# Patient Record
Sex: FEMALE | Race: ASIAN | NOT HISPANIC OR LATINO | Employment: UNEMPLOYED | URBAN - METROPOLITAN AREA
[De-identification: names, ages, dates, MRNs, and addresses within clinical notes are randomized per-mention and may not be internally consistent; named-entity substitution may affect disease eponyms.]

---

## 2020-11-05 ENCOUNTER — HOSPITAL ENCOUNTER (EMERGENCY)
Age: 60
Discharge: HOME | End: 2020-11-05

## 2020-11-05 VITALS
SYSTOLIC BLOOD PRESSURE: 140 MMHG | WEIGHT: 157.41 LBS | OXYGEN SATURATION: 100 % | TEMPERATURE: 97.5 F | RESPIRATION RATE: 16 BRPM | HEART RATE: 70 BPM | DIASTOLIC BLOOD PRESSURE: 79 MMHG

## 2020-11-05 DIAGNOSIS — H43.391 VITREOUS FLOATERS OF RIGHT EYE: ICD-10-CM

## 2020-11-05 DIAGNOSIS — H57.11 PAIN IN RIGHT EYE: ICD-10-CM

## 2020-11-05 DIAGNOSIS — S05.01XA ABRASION OF RIGHT CORNEA, INITIAL ENCOUNTER: Primary | ICD-10-CM

## 2020-11-05 PROCEDURE — 10002801 HB RX 250 W/O HCPCS: Performed by: NURSE PRACTITIONER

## 2020-11-05 PROCEDURE — 99284 EMERGENCY DEPT VISIT MOD MDM: CPT | Performed by: NURSE PRACTITIONER

## 2020-11-05 PROCEDURE — 99283 EMERGENCY DEPT VISIT LOW MDM: CPT

## 2020-11-05 RX ORDER — PROPARACAINE HYDROCHLORIDE 5 MG/ML
1 SOLUTION/ DROPS OPHTHALMIC ONCE
Status: DISCONTINUED | OUTPATIENT
Start: 2020-11-05 | End: 2020-11-05 | Stop reason: HOSPADM

## 2020-11-05 SDOH — HEALTH STABILITY: MENTAL HEALTH: HOW OFTEN DO YOU HAVE A DRINK CONTAINING ALCOHOL?: NEVER

## 2020-11-05 ASSESSMENT — ENCOUNTER SYMPTOMS
HEADACHES: 0
FATIGUE: 0
DIZZINESS: 0
SHORTNESS OF BREATH: 0
EYE REDNESS: 0
WEAKNESS: 0
VOMITING: 0
FACIAL SWELLING: 0
WOUND: 0
FEVER: 0
NUMBNESS: 0
SINUS PAIN: 0
PHOTOPHOBIA: 1
CHILLS: 0
CONFUSION: 0
BRUISES/BLEEDS EASILY: 0
NAUSEA: 0
EYE ITCHING: 0
EYE PAIN: 1
ADENOPATHY: 0
EYE DISCHARGE: 0

## 2020-11-05 ASSESSMENT — PAIN DESCRIPTION - PAIN TYPE: TYPE: ACUTE PAIN

## 2020-11-05 ASSESSMENT — PAIN SCALES - GENERAL: PAINLEVEL_OUTOF10: 7

## 2021-06-07 RX ORDER — LOSARTAN POTASSIUM 50 MG/1
50 TABLET ORAL DAILY
COMMUNITY
End: 2022-01-05 | Stop reason: SDUPTHER

## 2021-06-07 RX ORDER — CLOPIDOGREL BISULFATE 75 MG/1
75 TABLET ORAL 2 TIMES WEEKLY
COMMUNITY
End: 2022-01-05 | Stop reason: SDUPTHER

## 2021-06-07 RX ORDER — ATENOLOL 25 MG/1
25 TABLET ORAL 2 TIMES WEEKLY
COMMUNITY
End: 2022-01-05 | Stop reason: SDUPTHER

## 2021-06-07 RX ORDER — CAPTOPRIL 25 MG/1
25 TABLET ORAL ONCE AS NEEDED
COMMUNITY
End: 2021-06-07 | Stop reason: SDUPTHER

## 2021-06-08 RX ORDER — CAPTOPRIL 25 MG/1
25 TABLET ORAL ONCE AS NEEDED
Qty: 90 TABLET | Refills: 0 | Status: SHIPPED | OUTPATIENT
Start: 2021-06-08 | End: 2022-01-05 | Stop reason: SDUPTHER

## 2021-06-23 RX ORDER — AMLODIPINE BESYLATE 5 MG/1
5 TABLET ORAL DAILY
COMMUNITY
End: 2021-06-23 | Stop reason: SDUPTHER

## 2021-06-24 RX ORDER — AMLODIPINE BESYLATE 5 MG/1
5 TABLET ORAL DAILY
Qty: 90 TABLET | Refills: 0 | Status: SHIPPED | OUTPATIENT
Start: 2021-06-24 | End: 2022-01-05 | Stop reason: SDUPTHER

## 2022-01-04 ENCOUNTER — OFFICE VISIT (OUTPATIENT)
Dept: FAMILY MEDICINE | Facility: CLINIC | Age: 62
End: 2022-01-04
Payer: COMMERCIAL

## 2022-01-04 VITALS
SYSTOLIC BLOOD PRESSURE: 142 MMHG | WEIGHT: 155.6 LBS | RESPIRATION RATE: 16 BRPM | HEART RATE: 65 BPM | BODY MASS INDEX: 27.57 KG/M2 | TEMPERATURE: 98.2 F | HEIGHT: 63 IN | DIASTOLIC BLOOD PRESSURE: 80 MMHG | OXYGEN SATURATION: 99 %

## 2022-01-04 DIAGNOSIS — M85.88 OSTEOPENIA OF SPINE: ICD-10-CM

## 2022-01-04 DIAGNOSIS — Z12.31 ENCOUNTER FOR SCREENING MAMMOGRAM FOR MALIGNANT NEOPLASM OF BREAST: ICD-10-CM

## 2022-01-04 DIAGNOSIS — D69.6: ICD-10-CM

## 2022-01-04 DIAGNOSIS — I10 HYPERTENSION, UNSPECIFIED TYPE: Primary | ICD-10-CM

## 2022-01-04 PROCEDURE — 90471 IMMUNIZATION ADMIN: CPT | Performed by: FAMILY MEDICINE

## 2022-01-04 PROCEDURE — 99214 OFFICE O/P EST MOD 30 MIN: CPT | Mod: 25 | Performed by: FAMILY MEDICINE

## 2022-01-04 PROCEDURE — 3008F BODY MASS INDEX DOCD: CPT | Performed by: FAMILY MEDICINE

## 2022-01-04 PROCEDURE — 90686 IIV4 VACC NO PRSV 0.5 ML IM: CPT | Performed by: FAMILY MEDICINE

## 2022-01-04 RX ORDER — HYDROGEN PEROXIDE 3 %
20 SOLUTION, NON-ORAL MISCELLANEOUS
COMMUNITY
End: 2022-01-04

## 2022-01-04 RX ORDER — ALENDRONATE SODIUM TABLET 35 MG/1
35 TABLET ORAL
Qty: 12 TABLET | Refills: 1 | Status: SHIPPED | OUTPATIENT
Start: 2022-01-04 | End: 2022-06-21

## 2022-01-04 ASSESSMENT — ENCOUNTER SYMPTOMS
NEUROLOGICAL NEGATIVE: 1
RESPIRATORY NEGATIVE: 1
EYES NEGATIVE: 1
ALLERGIC/IMMUNOLOGIC NEGATIVE: 1
CARDIOVASCULAR NEGATIVE: 1
CONSTITUTIONAL NEGATIVE: 1
HEMATOLOGIC/LYMPHATIC NEGATIVE: 1
MUSCULOSKELETAL NEGATIVE: 1
GASTROINTESTINAL NEGATIVE: 1
PSYCHIATRIC NEGATIVE: 1
ENDOCRINE NEGATIVE: 1

## 2022-01-04 ASSESSMENT — PATIENT HEALTH QUESTIONNAIRE - PHQ9: SUM OF ALL RESPONSES TO PHQ9 QUESTIONS 1 & 2: 0

## 2022-01-04 NOTE — PROGRESS NOTES
[facility] - [facility]  Dr. Jaspreet Wayne  [address] [place] [address]     Marjan Davidson is a 61 y.o. female who present for   Chief Complaint   Patient presents with   • Annual Exam        She returned from [place] recently.  Was feeling very well there.  Had bone density scan there showing osteopenia.  Daughter who accompanies her today wants her to go on medication.          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      Current Outpatient Medications:   •  amLODIPine (NORVASC) 5 mg tablet, Take 1 tablet (5 mg total) by mouth daily., Disp: 90 tablet, Rfl: 0  •  atenoloL (TENORMIN) 25 mg tablet, Take 25 mg by mouth once as needed., Disp: , Rfl:   •  captopriL (CAPOTEN) 25 mg tablet, Take 1 tablet (25 mg total) by mouth once as needed (if BP is more than 150/90). 1 tablet SL if blood pressure is more than 150/90, Disp: 90 tablet, Rfl: 0  •  clopidogreL (PLAVIX) 75 mg tablet, Take 75 mg by mouth 2 (two) times a week (Mon, Thu). Take one tablet twice a week, Disp: , Rfl:   •  esomeprazole 20 mg capsule, Take 20 mg by mouth daily before breakfast., Disp: , Rfl:   •  losartan (COZAAR) 50 mg tablet, Take 50 mg by mouth daily. Take one half 1/2 of tablet daily, Disp: , Rfl:     Review of Systems   Constitutional: Negative.    HENT: Negative.    Eyes: Negative.   "  Respiratory: Negative.    Cardiovascular: Negative.    Gastrointestinal: Negative.    Endocrine: Negative.    Genitourinary: Negative.    Musculoskeletal: Negative.    Skin: Negative.    Allergic/Immunologic: Negative.    Neurological: Negative.    Hematological: Negative.    Psychiatric/Behavioral: Negative.        Vitals:    01/04/22 1323   BP: (!) 142/80   Pulse: 65   Resp: 16   Temp: 36.8 °C (98.2 °F)   SpO2: 99%   Weight: 70.6 kg (155 lb 9.6 oz)   Height: 1.588 m (5' 2.5\")     Body mass index is 28.01 kg/m².    Physical Exam  Vitals reviewed.   Constitutional:       Appearance: Normal appearance.   HENT:      Right Ear: Tympanic membrane normal.      Left Ear: Tympanic membrane normal.      Mouth/Throat:      Mouth: Mucous membranes are moist.   Eyes:      Conjunctiva/sclera: Conjunctivae normal.   Cardiovascular:      Rate and Rhythm: Normal rate.      Heart sounds: Normal heart sounds. No murmur heard.  Pulmonary:      Effort: Pulmonary effort is normal.      Breath sounds: Normal breath sounds. No rales.   Abdominal:      General: Bowel sounds are normal.      Palpations: Abdomen is soft.      Tenderness: There is no abdominal tenderness.   Musculoskeletal:         General: Normal range of motion.      Cervical back: Normal range of motion.      Right lower leg: No edema.      Left lower leg: No edema.   Lymphadenopathy:      Cervical: No cervical adenopathy.   Skin:     General: Skin is warm.   Neurological:      General: No focal deficit present.      Mental Status: She is alert and oriented to person, place, and time.   Psychiatric:         Mood and Affect: Mood normal.             Assessment   Problem List Items Addressed This Visit     None              Jaspreet Wayne MD  1/4/2022     "

## 2022-01-05 RX ORDER — AMLODIPINE BESYLATE 5 MG/1
5 TABLET ORAL DAILY
Qty: 90 TABLET | Refills: 1 | Status: SHIPPED | OUTPATIENT
Start: 2022-01-05 | End: 2022-07-18

## 2022-01-05 RX ORDER — CAPTOPRIL 25 MG/1
25 TABLET ORAL ONCE AS NEEDED
Qty: 90 TABLET | Refills: 1 | Status: SHIPPED | OUTPATIENT
Start: 2022-01-05 | End: 2022-04-04 | Stop reason: SDUPTHER

## 2022-01-05 RX ORDER — ATENOLOL 25 MG/1
25 TABLET ORAL 2 TIMES WEEKLY
Qty: 24 TABLET | Refills: 1 | Status: SHIPPED | OUTPATIENT
Start: 2022-01-06 | End: 2022-06-21

## 2022-01-05 RX ORDER — LOSARTAN POTASSIUM 50 MG/1
50 TABLET ORAL DAILY
Qty: 90 TABLET | Refills: 1 | Status: SHIPPED | OUTPATIENT
Start: 2022-01-05 | End: 2022-03-28 | Stop reason: SDUPTHER

## 2022-01-05 RX ORDER — CLOPIDOGREL BISULFATE 75 MG/1
75 TABLET ORAL 2 TIMES WEEKLY
Qty: 24 TABLET | Refills: 1 | Status: SHIPPED | OUTPATIENT
Start: 2022-01-06 | End: 2022-06-21

## 2022-01-06 ENCOUNTER — TELEPHONE (OUTPATIENT)
Dept: FAMILY MEDICINE | Facility: CLINIC | Age: 62
End: 2022-01-06
Payer: COMMERCIAL

## 2022-01-12 ENCOUNTER — TELEPHONE (OUTPATIENT)
Dept: FAMILY MEDICINE | Facility: CLINIC | Age: 62
End: 2022-01-12
Payer: COMMERCIAL

## 2022-01-12 NOTE — TELEPHONE ENCOUNTER
[facility] needs clarification on whether the losartan should be 1 tablet daily or 1/2 tablet daily, please advise, thanks.

## 2022-01-20 LAB
ALBUMIN SERPL-MCNC: 4.4 G/DL (ref 3.8–4.9)
ALBUMIN/GLOB SERPL: 1.7 {RATIO} (ref 1.2–2.2)
ALP SERPL-CCNC: 50 IU/L (ref 44–121)
ALT SERPL-CCNC: 27 IU/L (ref 0–32)
AST SERPL-CCNC: 24 IU/L (ref 0–40)
BASOPHILS # BLD AUTO: 0.1 X10E3/UL (ref 0–0.2)
BASOPHILS NFR BLD AUTO: 1 %
BILIRUB SERPL-MCNC: 0.2 MG/DL (ref 0–1.2)
BUN SERPL-MCNC: 11 MG/DL (ref 8–27)
BUN/CREAT SERPL: 17 (ref 12–28)
CALCIUM SERPL-MCNC: 9.2 MG/DL (ref 8.7–10.3)
CHLORIDE SERPL-SCNC: 100 MMOL/L (ref 96–106)
CHOLEST SERPL-MCNC: 131 MG/DL (ref 100–199)
CO2 SERPL-SCNC: 23 MMOL/L (ref 20–29)
CREAT SERPL-MCNC: 0.66 MG/DL (ref 0.57–1)
EOSINOPHIL # BLD AUTO: 0.2 X10E3/UL (ref 0–0.4)
EOSINOPHIL NFR BLD AUTO: 2 %
ERYTHROCYTE [DISTWIDTH] IN BLOOD BY AUTOMATED COUNT: 12.4 % (ref 11.7–15.4)
GLOBULIN SER CALC-MCNC: 2.6 G/DL (ref 1.5–4.5)
GLUCOSE SERPL-MCNC: 104 MG/DL (ref 65–99)
HCT VFR BLD AUTO: 37.3 % (ref 34–46.6)
HDLC SERPL-MCNC: 53 MG/DL
HGB BLD-MCNC: 12.2 G/DL (ref 11.1–15.9)
IMM GRANULOCYTES # BLD AUTO: 0 X10E3/UL (ref 0–0.1)
IMM GRANULOCYTES NFR BLD AUTO: 0 %
IRON SATN MFR SERPL: 25 % (ref 15–55)
IRON SERPL-MCNC: 87 UG/DL (ref 27–159)
LAB CORP EGFR IF AFRICN AM: 111 ML/MIN/1.73
LAB CORP EGFR IF NONAFRICN AM: 96 ML/MIN/1.73
LDLC SERPL CALC-MCNC: 69 MG/DL (ref 0–99)
LYMPHOCYTES # BLD AUTO: 2.2 X10E3/UL (ref 0.7–3.1)
LYMPHOCYTES NFR BLD AUTO: 32 %
MCH RBC QN AUTO: 27.5 PG (ref 26.6–33)
MCHC RBC AUTO-ENTMCNC: 32.7 G/DL (ref 31.5–35.7)
MCV RBC AUTO: 84 FL (ref 79–97)
MONOCYTES # BLD AUTO: 0.5 X10E3/UL (ref 0.1–0.9)
MONOCYTES NFR BLD AUTO: 8 %
NEUTROPHILS # BLD AUTO: 3.9 X10E3/UL (ref 1.4–7)
NEUTROPHILS NFR BLD AUTO: 57 %
PLATELET # BLD AUTO: 332 X10E3/UL (ref 150–450)
POTASSIUM SERPL-SCNC: 4.3 MMOL/L (ref 3.5–5.2)
PROT SERPL-MCNC: 7 G/DL (ref 6–8.5)
RBC # BLD AUTO: 4.44 X10E6/UL (ref 3.77–5.28)
SODIUM SERPL-SCNC: 138 MMOL/L (ref 134–144)
TIBC SERPL-MCNC: 345 UG/DL (ref 250–450)
TRIGL SERPL-MCNC: 35 MG/DL (ref 0–149)
TSH SERPL DL<=0.005 MIU/L-ACNC: 2.25 UIU/ML (ref 0.45–4.5)
UIBC SERPL-MCNC: 258 UG/DL (ref 131–425)
VIT B12 SERPL-MCNC: 218 PG/ML (ref 232–1245)
VLDLC SERPL CALC-MCNC: 9 MG/DL (ref 5–40)
WBC # BLD AUTO: 6.8 X10E3/UL (ref 3.4–10.8)

## 2022-01-21 LAB
25(OH)D3+25(OH)D2 SERPL-MCNC: 81.5 NG/ML (ref 30–100)
APPEARANCE UR: CLEAR
BACTERIA #/AREA URNS HPF: NORMAL /[HPF]
BILIRUB UR QL STRIP: NEGATIVE
CASTS URNS QL MICRO: NORMAL /LPF
COLOR UR: YELLOW
EPI CELLS #/AREA URNS HPF: NORMAL /HPF (ref 0–10)
GLUCOSE UR QL STRIP: NEGATIVE
HGB UR QL STRIP: ABNORMAL
KETONES UR QL STRIP: NEGATIVE
LAB CORP URINALYSIS REFLEX: ABNORMAL
LEUKOCYTE ESTERASE UR QL STRIP: NEGATIVE
MICRO URNS: ABNORMAL
NITRITE UR QL STRIP: NEGATIVE
PH UR STRIP: 6.5 [PH] (ref 5–7.5)
PROT UR QL STRIP: NEGATIVE
RBC #/AREA URNS HPF: NORMAL /HPF (ref 0–2)
SP GR UR STRIP: 1.01 (ref 1–1.03)
UROBILINOGEN UR STRIP-MCNC: 0.2 MG/DL (ref 0.2–1)
WBC #/AREA URNS HPF: NORMAL /HPF (ref 0–5)

## 2022-01-31 ENCOUNTER — TELEPHONE (OUTPATIENT)
Dept: FAMILY MEDICINE | Facility: CLINIC | Age: 62
End: 2022-01-31
Payer: COMMERCIAL

## 2022-01-31 NOTE — TELEPHONE ENCOUNTER
I reviewed labs-it does look like Dr Wayne has as well-let patient know-slightly low vitamin b 12, slightly elevated glucose-104- rec balanced diet/routine exercise simple carbs/foods/drinks high in sugar. Cont follow up with Dr Wayne

## 2022-03-28 RX ORDER — LOSARTAN POTASSIUM 50 MG/1
50 TABLET ORAL DAILY
Qty: 90 TABLET | Refills: 1 | Status: SHIPPED | OUTPATIENT
Start: 2022-03-28 | End: 2022-04-04 | Stop reason: SDUPTHER

## 2022-03-28 NOTE — TELEPHONE ENCOUNTER
Pt's daughter called requesting to return to a full tablet of losartan per day, rather than half. She stated Dr. Wayne instructed her to call if an increase was needed.     Medicine Refill Request    Last Office: 1/4/2022   Last Consult Visit: Visit date not found  Last Telemedicine Visit: Visit date not found    Next Appointment: Visit date not found      Current Outpatient Medications:   •  alendronate 35 mg tablet, Take 1 tablet (35 mg total) by mouth every (seven) 7 days. Take in the morning with a full glass of water, on an empty stomach, and do not take anything else by mouth or lie down for the next 30 min., Disp: 12 tablet, Rfl: 1  •  amLODIPine 5 mg tablet, Take 1 tablet (5 mg total) by mouth daily., Disp: 90 tablet, Rfl: 1  •  atenoloL 25 mg tablet, Take 1 tablet (25 mg total) by mouth 2 (two) times a week (Mon, Thu)., Disp: 24 tablet, Rfl: 1  •  captopriL 25 mg tablet, Take 1 tablet (25 mg total) by mouth once as needed (if BP is more than 150/90). 1 tablet SL if blood pressure is more than 150/90, Disp: 90 tablet, Rfl: 1  •  clopidogreL 75 mg tablet, Take 1 tablet (75 mg total) by mouth 2 (two) times a week (Mon, Thu). Take one tablet twice a week, Disp: 24 tablet, Rfl: 1  •  losartan 50 mg tablet, Take 1 tablet (50 mg total) by mouth daily. Take one half 1/2 of tablet daily, Disp: 90 tablet, Rfl: 1      BP Readings from Last 3 Encounters:   01/04/22 (!) 142/80       Recent Lab results:  Lab Results   Component Value Date    CHOL 131 01/20/2022   ,   Lab Results   Component Value Date    HDL 53 01/20/2022   ,   Lab Results   Component Value Date    LDLCALC 69 01/20/2022   ,   Lab Results   Component Value Date    TRIG 35 01/20/2022        Lab Results   Component Value Date    GLUCOSE 104 (H) 01/20/2022   , No results found for: HGBA1C      Lab Results   Component Value Date    CREATININE 0.66 01/20/2022       Lab Results   Component Value Date    TSH 2.250 01/20/2022

## 2022-04-04 ENCOUNTER — TELEPHONE (OUTPATIENT)
Dept: FAMILY MEDICINE | Facility: CLINIC | Age: 62
End: 2022-04-04
Payer: COMMERCIAL

## 2022-04-04 RX ORDER — CAPTOPRIL 25 MG/1
25 TABLET ORAL ONCE AS NEEDED
Qty: 90 TABLET | Refills: 1 | Status: SHIPPED | OUTPATIENT
Start: 2022-04-04 | End: 2024-03-04 | Stop reason: SDUPTHER

## 2022-04-04 RX ORDER — LOSARTAN POTASSIUM 50 MG/1
50 TABLET ORAL DAILY
Qty: 90 TABLET | Refills: 1 | Status: SHIPPED | OUTPATIENT
Start: 2022-04-04 | End: 2022-09-30

## 2022-04-04 NOTE — TELEPHONE ENCOUNTER
Pt needs refill of captopril 25mg.     Medicine Refill Request    Last Office: 1/4/2022   Last Consult Visit: Visit date not found  Last Telemedicine Visit: Visit date not found    Next Appointment: Visit date not found      Current Outpatient Medications:   •  alendronate 35 mg tablet, Take 1 tablet (35 mg total) by mouth every (seven) 7 days. Take in the morning with a full glass of water, on an empty stomach, and do not take anything else by mouth or lie down for the next 30 min., Disp: 12 tablet, Rfl: 1  •  amLODIPine 5 mg tablet, Take 1 tablet (5 mg total) by mouth daily., Disp: 90 tablet, Rfl: 1  •  atenoloL 25 mg tablet, Take 1 tablet (25 mg total) by mouth 2 (two) times a week (Mon, Thu)., Disp: 24 tablet, Rfl: 1  •  captopriL 25 mg tablet, Take 1 tablet (25 mg total) by mouth once as needed (if BP is more than 150/90). 1 tablet SL if blood pressure is more than 150/90, Disp: 90 tablet, Rfl: 1  •  clopidogreL 75 mg tablet, Take 1 tablet (75 mg total) by mouth 2 (two) times a week (Mon, Thu). Take one tablet twice a week, Disp: 24 tablet, Rfl: 1  •  losartan (COZAAR) 50 mg tablet, Take 1 tablet (50 mg total) by mouth daily. Take one half 1/2 of tablet daily, Disp: 90 tablet, Rfl: 1      BP Readings from Last 3 Encounters:   01/04/22 (!) 142/80       Recent Lab results:  Lab Results   Component Value Date    CHOL 131 01/20/2022   ,   Lab Results   Component Value Date    HDL 53 01/20/2022   ,   Lab Results   Component Value Date    LDLCALC 69 01/20/2022   ,   Lab Results   Component Value Date    TRIG 35 01/20/2022        Lab Results   Component Value Date    GLUCOSE 104 (H) 01/20/2022   , No results found for: HGBA1C      Lab Results   Component Value Date    CREATININE 0.66 01/20/2022       Lab Results   Component Value Date    TSH 2.250 01/20/2022

## 2022-04-04 NOTE — TELEPHONE ENCOUNTER
The script that was sent over has 2 different dosing instructions. I called the pharm and they need a new script that says 1 tablet daily.     Pt is fully out of medication.

## 2022-04-22 ENCOUNTER — TELEPHONE (OUTPATIENT)
Dept: FAMILY MEDICINE | Facility: CLINIC | Age: 62
End: 2022-04-22

## 2022-04-22 NOTE — TELEPHONE ENCOUNTER
Pt's daughter states they already got the amlodipine refilled and she is unable to come in for an appt because she will be traveling Monday morning for 5 months.

## 2022-06-21 RX ORDER — ALENDRONATE SODIUM TABLET 35 MG/1
TABLET ORAL
Qty: 12 TABLET | Refills: 1 | Status: SHIPPED | OUTPATIENT
Start: 2022-06-21 | End: 2023-01-30

## 2022-06-21 RX ORDER — ATENOLOL 25 MG/1
25 TABLET ORAL 2 TIMES WEEKLY
Qty: 24 TABLET | Refills: 1 | Status: SHIPPED | OUTPATIENT
Start: 2022-06-23 | End: 2023-01-24

## 2022-06-21 RX ORDER — CLOPIDOGREL BISULFATE 75 MG/1
75 TABLET ORAL 2 TIMES WEEKLY
Qty: 24 TABLET | Refills: 1 | Status: SHIPPED | OUTPATIENT
Start: 2022-06-23 | End: 2023-01-30

## 2022-07-18 RX ORDER — AMLODIPINE BESYLATE 5 MG/1
5 TABLET ORAL DAILY
Qty: 90 TABLET | Refills: 1 | Status: SHIPPED | OUTPATIENT
Start: 2022-07-18 | End: 2023-01-30 | Stop reason: SDUPTHER

## 2022-09-30 RX ORDER — LOSARTAN POTASSIUM 50 MG/1
TABLET ORAL
Qty: 90 TABLET | Refills: 0 | Status: SHIPPED | OUTPATIENT
Start: 2022-09-30 | End: 2023-01-30 | Stop reason: SDUPTHER

## 2022-12-12 RX ORDER — ALENDRONATE SODIUM TABLET 35 MG/1
TABLET ORAL
Qty: 12 TABLET | Refills: 1 | OUTPATIENT
Start: 2022-12-12

## 2022-12-12 RX ORDER — ATENOLOL 25 MG/1
25 TABLET ORAL 2 TIMES WEEKLY
Qty: 24 TABLET | Refills: 1 | OUTPATIENT
Start: 2022-12-12 | End: 2023-06-10

## 2022-12-12 RX ORDER — CLOPIDOGREL BISULFATE 75 MG/1
TABLET ORAL
Qty: 24 TABLET | Refills: 1 | OUTPATIENT
Start: 2022-12-12

## 2022-12-12 NOTE — TELEPHONE ENCOUNTER
Pt has not been seen since 01/22 called daughter she does not need a refill yet but they will have her come in before she is due.

## 2023-01-13 RX ORDER — AMLODIPINE BESYLATE 5 MG/1
5 TABLET ORAL DAILY
Qty: 90 TABLET | Refills: 1 | OUTPATIENT
Start: 2023-01-13 | End: 2023-04-13

## 2023-01-13 NOTE — TELEPHONE ENCOUNTER
Called patient spoke with daughter patient does not need medication right now she will be back end of January, she asked that we hold off on filling and she will schedule an appointment

## 2023-01-24 ENCOUNTER — OFFICE VISIT (OUTPATIENT)
Dept: FAMILY MEDICINE | Facility: CLINIC | Age: 63
End: 2023-01-24
Payer: COMMERCIAL

## 2023-01-24 VITALS
BODY MASS INDEX: 30.59 KG/M2 | HEIGHT: 62 IN | OXYGEN SATURATION: 99 % | SYSTOLIC BLOOD PRESSURE: 132 MMHG | WEIGHT: 166.2 LBS | RESPIRATION RATE: 16 BRPM | DIASTOLIC BLOOD PRESSURE: 88 MMHG | HEART RATE: 65 BPM | TEMPERATURE: 97.7 F

## 2023-01-24 DIAGNOSIS — Z00.00 ROUTINE GENERAL MEDICAL EXAMINATION AT A HEALTH CARE FACILITY: Primary | ICD-10-CM

## 2023-01-24 DIAGNOSIS — M85.88 OSTEOPENIA OF SPINE: ICD-10-CM

## 2023-01-24 DIAGNOSIS — Z12.11 SCREENING FOR COLON CANCER: ICD-10-CM

## 2023-01-24 PROCEDURE — 99396 PREV VISIT EST AGE 40-64: CPT | Performed by: FAMILY MEDICINE

## 2023-01-24 PROCEDURE — 3008F BODY MASS INDEX DOCD: CPT | Performed by: FAMILY MEDICINE

## 2023-01-24 ASSESSMENT — ENCOUNTER SYMPTOMS
NEUROLOGICAL NEGATIVE: 1
EYES NEGATIVE: 1
CONSTITUTIONAL NEGATIVE: 1
ARTHRALGIAS: 1
ENDOCRINE NEGATIVE: 1
RESPIRATORY NEGATIVE: 1
HEMATOLOGIC/LYMPHATIC NEGATIVE: 1
CARDIOVASCULAR NEGATIVE: 1
PSYCHIATRIC NEGATIVE: 1
ALLERGIC/IMMUNOLOGIC NEGATIVE: 1
GASTROINTESTINAL NEGATIVE: 1

## 2023-01-24 NOTE — PROGRESS NOTES
[facility]  Dr. Jaspreet Wayne  [address] [place] [address]     Marjan Davidson is a 62 y.o. female who present for   Chief Complaint   Patient presents with   • Annual Exam        Accompanied by daughter.          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      Current Outpatient Medications:   •  alendronate (FOSAMAX) 35 mg tablet, 1 TAB EVERY 7 DAYS IN MORNING WITH A FULL GLASS OF WATER ON EMPTY STOMACH, DO NOT TAKE ANYTHING ELSE BY MOUTH OR DO NOT LIE DOWN FOR THE NEXT 30 MIN, Disp: 12 tablet, Rfl: 1  •  amLODIPine (NORVASC) 5 mg tablet, TAKE 1 TABLET (5 MG TOTAL) BY MOUTH DAILY., Disp: 90 tablet, Rfl: 1  •  clopidogreL (PLAVIX) 75 mg tablet, TAKE 1 TABLET (75 MG TOTAL) BY MOUTH 2 (TWO) TIMES A WEEK (MON, THU). TAKE ONE TABLET TWICE A WEEK, Disp: 24 tablet, Rfl: 1  •  losartan (COZAAR) 50 mg tablet, TAKE 1 TABLET BY MOUTH EVERY DAY, Disp: 90 tablet, Rfl: 0  •  captopriL (CAPOTEN) 25 mg tablet, Take 1 tablet (25 mg total) by mouth once as needed (if BP is more than 150/90). 1 tablet SL if blood pressure is more than 150/90, Disp: 90 tablet, Rfl: 1    Review of Systems   Constitutional: Negative.    HENT: Negative.    Eyes: Negative.    Respiratory: Negative.    Cardiovascular: Negative.    Gastrointestinal: Negative.    Endocrine: Negative.    Genitourinary: Negative.   "  Musculoskeletal: Positive for arthralgias (Lt knee pain.).   Skin: Negative.    Allergic/Immunologic: Negative.    Neurological: Negative.    Hematological: Negative.    Psychiatric/Behavioral: Negative.        Vitals:    01/24/23 1331   BP: 132/88   BP Location: Right upper arm   Patient Position: Sitting   Pulse: 65   Resp: 16   Temp: 36.5 °C (97.7 °F)   TempSrc: Oral   SpO2: 99%   Weight: 75.4 kg (166 lb 3.2 oz)   Height: 1.575 m (5' 2\")     Body mass index is 30.4 kg/m².    Physical Exam  Vitals reviewed.   Constitutional:       Appearance: Normal appearance.   HENT:      Right Ear: Tympanic membrane normal.      Left Ear: Tympanic membrane normal.      Mouth/Throat:      Mouth: Mucous membranes are moist.   Eyes:      Conjunctiva/sclera: Conjunctivae normal.   Cardiovascular:      Rate and Rhythm: Normal rate.      Heart sounds: Normal heart sounds. No murmur heard.  Pulmonary:      Effort: Pulmonary effort is normal.      Breath sounds: Normal breath sounds. No rales.   Abdominal:      General: Bowel sounds are normal.      Palpations: Abdomen is soft.      Tenderness: There is no abdominal tenderness.   Musculoskeletal:         General: Normal range of motion.      Cervical back: Normal range of motion.      Right lower leg: No edema.      Left lower leg: No edema.      Comments: Hallux valgus of Rt great toe.   Lymphadenopathy:      Cervical: No cervical adenopathy.   Skin:     General: Skin is warm.   Neurological:      General: No focal deficit present.      Mental Status: She is alert and oriented to person, place, and time.   Psychiatric:         Mood and Affect: Mood normal.             Assessment   Problem List Items Addressed This Visit        Musculoskeletal    Osteopenia of spine    Relevant Orders    DEXA BONE DENSITY       Other    Routine general medical examination at a health care facility - Primary    Relevant Orders    CBC and Differential    Comprehensive metabolic panel    Lipid panel    " TSH 3rd Generation    Hemoglobin A1c    Vitamin D 25 hydroxy   Other Visit Diagnoses     Screening for colon cancer        Relevant Orders    Fecal Immunochemical              Jaspreet Wayne MD  1/24/2023

## 2023-01-30 ENCOUNTER — TELEPHONE (OUTPATIENT)
Dept: FAMILY MEDICINE | Facility: CLINIC | Age: 63
End: 2023-01-30
Payer: COMMERCIAL

## 2023-01-30 RX ORDER — AMLODIPINE BESYLATE 5 MG/1
5 TABLET ORAL DAILY
Qty: 90 TABLET | Refills: 1 | Status: SHIPPED | OUTPATIENT
Start: 2023-01-30 | End: 2023-08-01

## 2023-01-30 RX ORDER — LOSARTAN POTASSIUM 50 MG/1
50 TABLET ORAL DAILY
Qty: 90 TABLET | Refills: 1 | Status: SHIPPED | OUTPATIENT
Start: 2023-01-30 | End: 2023-08-01

## 2023-01-30 RX ORDER — ALENDRONATE SODIUM TABLET 35 MG/1
TABLET ORAL
Qty: 12 TABLET | Refills: 1 | Status: SHIPPED | OUTPATIENT
Start: 2023-01-30 | End: 2023-07-17

## 2023-01-30 RX ORDER — CLOPIDOGREL BISULFATE 75 MG/1
TABLET ORAL
Qty: 24 TABLET | Refills: 1 | Status: SHIPPED | OUTPATIENT
Start: 2023-01-30 | End: 2023-07-17

## 2023-01-31 LAB
BASOPHILS # BLD AUTO: 0.1 X10E3/UL (ref 0–0.2)
BASOPHILS NFR BLD AUTO: 1 %
EOSINOPHIL # BLD AUTO: 0.2 X10E3/UL (ref 0–0.4)
EOSINOPHIL NFR BLD AUTO: 2 %
ERYTHROCYTE [DISTWIDTH] IN BLOOD BY AUTOMATED COUNT: 12.8 % (ref 11.7–15.4)
HBA1C MFR BLD: 6.5 % (ref 4.8–5.6)
HCT VFR BLD AUTO: 37.2 % (ref 34–46.6)
HGB BLD-MCNC: 12.5 G/DL (ref 11.1–15.9)
IMM GRANULOCYTES # BLD AUTO: 0 X10E3/UL (ref 0–0.1)
IMM GRANULOCYTES NFR BLD AUTO: 0 %
LYMPHOCYTES # BLD AUTO: 2.2 X10E3/UL (ref 0.7–3.1)
LYMPHOCYTES NFR BLD AUTO: 34 %
MCH RBC QN AUTO: 28.2 PG (ref 26.6–33)
MCHC RBC AUTO-ENTMCNC: 33.6 G/DL (ref 31.5–35.7)
MCV RBC AUTO: 84 FL (ref 79–97)
MONOCYTES # BLD AUTO: 0.5 X10E3/UL (ref 0.1–0.9)
MONOCYTES NFR BLD AUTO: 8 %
NEUTROPHILS # BLD AUTO: 3.7 X10E3/UL (ref 1.4–7)
NEUTROPHILS NFR BLD AUTO: 55 %
PLATELET # BLD AUTO: 284 X10E3/UL (ref 150–450)
RBC # BLD AUTO: 4.43 X10E6/UL (ref 3.77–5.28)
WBC # BLD AUTO: 6.6 X10E3/UL (ref 3.4–10.8)

## 2023-01-31 RX ORDER — LOSARTAN POTASSIUM 50 MG/1
TABLET ORAL
Qty: 90 TABLET | Refills: 0 | OUTPATIENT
Start: 2023-01-31

## 2023-01-31 RX ORDER — AMLODIPINE BESYLATE 5 MG/1
5 TABLET ORAL DAILY
Qty: 90 TABLET | Refills: 1 | OUTPATIENT
Start: 2023-01-31 | End: 2023-05-01

## 2023-02-01 LAB
25(OH)D3+25(OH)D2 SERPL-MCNC: 79.4 NG/ML (ref 30–100)
ALBUMIN SERPL-MCNC: 4.4 G/DL (ref 3.8–4.8)
ALBUMIN/GLOB SERPL: 1.8 {RATIO} (ref 1.2–2.2)
ALP SERPL-CCNC: 45 IU/L (ref 44–121)
ALT SERPL-CCNC: 18 IU/L (ref 0–32)
AST SERPL-CCNC: 22 IU/L (ref 0–40)
BILIRUB SERPL-MCNC: 0.3 MG/DL (ref 0–1.2)
BUN SERPL-MCNC: 18 MG/DL (ref 8–27)
BUN/CREAT SERPL: 26 (ref 12–28)
CALCIUM SERPL-MCNC: 9.5 MG/DL (ref 8.7–10.3)
CHLORIDE SERPL-SCNC: 101 MMOL/L (ref 96–106)
CHOLEST SERPL-MCNC: 129 MG/DL (ref 100–199)
CO2 SERPL-SCNC: 24 MMOL/L (ref 20–29)
CREAT SERPL-MCNC: 0.68 MG/DL (ref 0.57–1)
EGFRCR SERPLBLD CKD-EPI 2021: 99 ML/MIN/1.73
GLOBULIN SER CALC-MCNC: 2.5 G/DL (ref 1.5–4.5)
GLUCOSE SERPL-MCNC: 97 MG/DL (ref 70–99)
HDLC SERPL-MCNC: 59 MG/DL
HEMOCCULT STL QL IA: NEGATIVE
LDLC SERPL CALC-MCNC: 61 MG/DL (ref 0–99)
POTASSIUM SERPL-SCNC: 4.3 MMOL/L (ref 3.5–5.2)
PROT SERPL-MCNC: 6.9 G/DL (ref 6–8.5)
SODIUM SERPL-SCNC: 139 MMOL/L (ref 134–144)
TRIGL SERPL-MCNC: 35 MG/DL (ref 0–149)
TSH SERPL DL<=0.005 MIU/L-ACNC: 2.25 UIU/ML (ref 0.45–4.5)
VLDLC SERPL CALC-MCNC: 9 MG/DL (ref 5–40)

## 2023-02-02 NOTE — RESULT ENCOUNTER NOTE
Please let patient know the blood work is all normal except A1c that is borderline high.  Please ask her to be careful with sugar and carbohydrates.  Stool testing is negative for blood.

## 2023-07-17 RX ORDER — ALENDRONATE SODIUM TABLET 35 MG/1
TABLET ORAL
Qty: 12 TABLET | Refills: 1 | Status: SHIPPED | OUTPATIENT
Start: 2023-07-17 | End: 2024-09-18

## 2023-07-17 RX ORDER — CLOPIDOGREL BISULFATE 75 MG/1
TABLET ORAL
Qty: 24 TABLET | Refills: 1 | Status: SHIPPED | OUTPATIENT
Start: 2023-07-17 | End: 2024-03-04 | Stop reason: SDUPTHER

## 2023-08-01 RX ORDER — LOSARTAN POTASSIUM 50 MG/1
50 TABLET ORAL DAILY
Qty: 90 TABLET | Refills: 1 | Status: SHIPPED | OUTPATIENT
Start: 2023-08-01 | End: 2024-03-04 | Stop reason: SDUPTHER

## 2023-08-01 RX ORDER — AMLODIPINE BESYLATE 5 MG/1
5 TABLET ORAL DAILY
Qty: 90 TABLET | Refills: 1 | Status: SHIPPED | OUTPATIENT
Start: 2023-08-01 | End: 2024-03-04 | Stop reason: SDUPTHER

## 2023-08-01 NOTE — TELEPHONE ENCOUNTER
Medicine Refill Request    Last Office Visit: 1/24/2023   Last Consult Visit: Visit date not found  Last Telemedicine Visit: Visit date not found    Next Appointment: Visit date not found      Current Outpatient Medications:   •  alendronate (FOSAMAX) 35 mg tablet, TAKE 1 TABLET BY MOUTH EVERY 7 DAYS IN MORNING WITH A FULL GLASS OF WATER ON EMPTY STOMACH, DO NOT TAKE ANYTHING ELSE BY MOUTH OR DO NOT LIE DOWN FOR THE NEXT 30 MIN, Disp: 12 tablet, Rfl: 1  •  amLODIPine (NORVASC) 5 mg tablet, Take 1 tablet (5 mg total) by mouth daily., Disp: 90 tablet, Rfl: 1  •  captopriL (CAPOTEN) 25 mg tablet, Take 1 tablet (25 mg total) by mouth once as needed (if BP is more than 150/90). 1 tablet SL if blood pressure is more than 150/90, Disp: 90 tablet, Rfl: 1  •  clopidogreL (PLAVIX) 75 mg tablet, TAKE 1 TABLET BY MOUTH 2 TIMES A WEEK ON MONDAY AND THURSDAY, Disp: 24 tablet, Rfl: 1  •  losartan (COZAAR) 50 mg tablet, Take 1 tablet (50 mg total) by mouth daily., Disp: 90 tablet, Rfl: 1      BP Readings from Last 3 Encounters:   01/24/23 132/88   01/04/22 (!) 142/80       Recent Lab results:  Lab Results   Component Value Date    CHOL 129 01/31/2023   ,   Lab Results   Component Value Date    HDL 59 01/31/2023   ,   Lab Results   Component Value Date    LDLCALC 61 01/31/2023   ,   Lab Results   Component Value Date    TRIG 35 01/31/2023        Lab Results   Component Value Date    GLUCOSE 97 01/31/2023   ,   Lab Results   Component Value Date    HGBA1C 6.5 (H) 01/31/2023         Lab Results   Component Value Date    CREATININE 0.68 01/31/2023       Lab Results   Component Value Date    TSH 2.250 01/31/2023           Lab Results   Component Value Date    HGBA1C 6.5 (H) 01/31/2023

## 2024-01-11 DIAGNOSIS — M85.88 OSTEOPENIA OF SPINE: Primary | ICD-10-CM

## 2024-01-15 NOTE — TELEPHONE ENCOUNTER
Medicine Refill Request    Last Office Visit: 1/24/2023   Last Consult Visit: Visit date not found  Last Telemedicine Visit: Visit date not found    Next Appointment: Visit date not found      Current Outpatient Medications:   •  alendronate (FOSAMAX) 35 mg tablet, TAKE 1 TABLET BY MOUTH EVERY 7 DAYS IN MORNING WITH A FULL GLASS OF WATER ON EMPTY STOMACH, DO NOT TAKE ANYTHING ELSE BY MOUTH OR DO NOT LIE DOWN FOR THE NEXT 30 MIN, Disp: 12 tablet, Rfl: 1  •  amLODIPine (NORVASC) 5 mg tablet, TAKE 1 TABLET (5 MG TOTAL) BY MOUTH DAILY., Disp: 90 tablet, Rfl: 1  •  captopriL (CAPOTEN) 25 mg tablet, Take 1 tablet (25 mg total) by mouth once as needed (if BP is more than 150/90). 1 tablet SL if blood pressure is more than 150/90, Disp: 90 tablet, Rfl: 1  •  clopidogreL (PLAVIX) 75 mg tablet, TAKE 1 TABLET BY MOUTH 2 TIMES A WEEK ON MONDAY AND THURSDAY, Disp: 24 tablet, Rfl: 1  •  losartan (COZAAR) 50 mg tablet, TAKE 1 TABLET BY MOUTH EVERY DAY, Disp: 90 tablet, Rfl: 1      BP Readings from Last 3 Encounters:   01/24/23 132/88   01/04/22 (!) 142/80       Recent Lab results:  Lab Results   Component Value Date    CHOL 129 01/31/2023   ,   Lab Results   Component Value Date    HDL 59 01/31/2023   ,   Lab Results   Component Value Date    LDLCALC 61 01/31/2023   ,   Lab Results   Component Value Date    TRIG 35 01/31/2023        Lab Results   Component Value Date    GLUCOSE 97 01/31/2023   ,   Lab Results   Component Value Date    HGBA1C 6.5 (H) 01/31/2023         Lab Results   Component Value Date    CREATININE 0.68 01/31/2023       Lab Results   Component Value Date    TSH 2.250 01/31/2023           Lab Results   Component Value Date    HGBA1C 6.5 (H) 01/31/2023

## 2024-01-16 RX ORDER — CLOPIDOGREL BISULFATE 75 MG/1
TABLET ORAL
Qty: 24 TABLET | Refills: 1 | OUTPATIENT
Start: 2024-01-16

## 2024-01-16 RX ORDER — ALENDRONATE SODIUM TABLET 35 MG/1
TABLET ORAL
Qty: 12 TABLET | Refills: 1 | OUTPATIENT
Start: 2024-01-16

## 2024-02-14 ENCOUNTER — OFFICE VISIT (OUTPATIENT)
Dept: FAMILY MEDICINE | Facility: CLINIC | Age: 64
End: 2024-02-14
Payer: COMMERCIAL

## 2024-02-14 VITALS
OXYGEN SATURATION: 99 % | SYSTOLIC BLOOD PRESSURE: 124 MMHG | DIASTOLIC BLOOD PRESSURE: 76 MMHG | TEMPERATURE: 98.6 F | WEIGHT: 163 LBS | HEART RATE: 70 BPM | BODY MASS INDEX: 29.81 KG/M2

## 2024-02-14 DIAGNOSIS — M85.88 OSTEOPENIA OF SPINE: ICD-10-CM

## 2024-02-14 DIAGNOSIS — I10 HYPERTENSION, UNSPECIFIED TYPE: ICD-10-CM

## 2024-02-14 DIAGNOSIS — K02.9 DENTAL DECAY: Primary | ICD-10-CM

## 2024-02-14 DIAGNOSIS — E11.9 TYPE 2 DIABETES MELLITUS WITHOUT COMPLICATION, WITHOUT LONG-TERM CURRENT USE OF INSULIN (CMS/HCC): ICD-10-CM

## 2024-02-14 PROCEDURE — 99214 OFFICE O/P EST MOD 30 MIN: CPT | Performed by: FAMILY MEDICINE

## 2024-02-14 PROCEDURE — 3074F SYST BP LT 130 MM HG: CPT | Performed by: FAMILY MEDICINE

## 2024-02-14 PROCEDURE — 3008F BODY MASS INDEX DOCD: CPT | Performed by: FAMILY MEDICINE

## 2024-02-14 PROCEDURE — 3078F DIAST BP <80 MM HG: CPT | Performed by: FAMILY MEDICINE

## 2024-02-14 ASSESSMENT — PATIENT HEALTH QUESTIONNAIRE - PHQ9: SUM OF ALL RESPONSES TO PHQ9 QUESTIONS 1 & 2: 0

## 2024-02-14 NOTE — PROGRESS NOTES
[facility]  Dr. Jaspreet Wayne  [address] [place] [address]     Marjan Davidson is a 63 y.o. female who present for   Chief Complaint   Patient presents with   • Forms/questionnaires        Accompanied by her daughter who she lives with.  Patient has been traveling to [facility] and [place].  Patient needs form completed for the dentist, she is getting a root canal.          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    • Dengue fever    • Depression    • Hair loss    • Hypertension    • Osteoporosis     Osteopenia now       Past Surgical History:   Procedure Laterality Date   • CEREBRAL ANEURYSM REPAIR     • EYE SURGERY      cataract removal 02/2019       Social History     Occupational History   • Not on file   Tobacco Use   • Smoking status: Never   • Smokeless tobacco: Never   Substance and Sexual Activity   • Alcohol use: Never   • Drug use: Never   • Sexual activity: Not on file        Family History   Problem Relation Age of Onset   • Heart disease Biological Father        Patient has no known allergies.      Current Outpatient Medications:   •  alendronate (FOSAMAX) 35 mg tablet, TAKE 1 TABLET BY MOUTH EVERY 7 DAYS IN MORNING WITH A FULL GLASS OF WATER ON EMPTY STOMACH, DO NOT TAKE ANYTHING ELSE BY MOUTH OR DO NOT LIE DOWN FOR THE NEXT 30 MIN, Disp: 12 tablet, Rfl: 1  •  amLODIPine (NORVASC) 5 mg tablet, TAKE 1 TABLET (5 MG TOTAL) BY MOUTH DAILY., Disp: 90 tablet, Rfl: 1  •  captopriL (CAPOTEN) 25 mg tablet, Take 1 tablet (25 mg total) by mouth once as needed (if BP is more than 150/90). 1 tablet SL if blood pressure is more than 150/90, Disp: 90 tablet, Rfl: 1  •  clopidogreL (PLAVIX) 75 mg tablet, TAKE 1 TABLET BY MOUTH 2 TIMES A WEEK ON MONDAY AND THURSDAY, Disp: 24 tablet, Rfl: 1  •  losartan (COZAAR) 50 mg tablet, TAKE 1 TABLET BY MOUTH EVERY DAY, Disp: 90 tablet, Rfl: 1    Review of Systems    Vitals:    02/14/24 1010   BP: 124/76   BP  Location: Right upper arm   Patient Position: Sitting   Pulse: 70   Temp: 37 °C (98.6 °F)   TempSrc: Oral   SpO2: 99%   Weight: 73.9 kg (163 lb)     Body mass index is 29.81 kg/m².    Physical Exam  Vitals reviewed.   Constitutional:       Appearance: Normal appearance.   HENT:      Right Ear: Tympanic membrane normal.      Left Ear: Tympanic membrane normal.      Mouth/Throat:      Mouth: Mucous membranes are moist.   Eyes:      Conjunctiva/sclera: Conjunctivae normal.   Cardiovascular:      Rate and Rhythm: Normal rate.      Heart sounds: Normal heart sounds. No murmur heard.  Pulmonary:      Effort: Pulmonary effort is normal.      Breath sounds: Normal breath sounds. No rales.   Abdominal:      General: Bowel sounds are normal.      Palpations: Abdomen is soft.      Tenderness: There is no abdominal tenderness.   Musculoskeletal:         General: Normal range of motion.      Cervical back: Normal range of motion.      Right lower leg: No edema.      Left lower leg: No edema.   Lymphadenopathy:      Cervical: No cervical adenopathy.   Skin:     General: Skin is warm.   Neurological:      General: No focal deficit present.      Mental Status: She is alert and oriented to person, place, and time.   Psychiatric:         Mood and Affect: Mood normal.             Assessment   Problem List Items Addressed This Visit        Circulatory    Hypertension     Daughter agreed to make appointment with cardiology to discuss the continuation of Plavix.  They agreed to get blood work done.            Musculoskeletal    Osteopenia of spine    Relevant Orders    Microalbumin/Creatinine Ur Random    CBC and Differential    Comprehensive metabolic panel    Lipid panel    TSH 3rd Generation    Hemoglobin A1c       Endocrine/Metabolic    Type 2 diabetes mellitus, without long-term current use of insulin (CMS/[facility])     Form completed.  Patient made aware that she needs to be seen every 6 months for continuation of care including  refills.         Relevant Orders    Microalbumin/Creatinine Ur Random    CBC and Differential    Comprehensive metabolic panel    Lipid panel    TSH 3rd Generation    Hemoglobin A1c       Infectious/Inflammatory    Dental decay - Primary     Form completed.         Relevant Orders    Microalbumin/Creatinine Ur Random    CBC and Differential    Comprehensive metabolic panel    Lipid panel    TSH 3rd Generation    Hemoglobin A1c           Jaspreet Wayne MD  2/19/2024

## 2024-02-19 NOTE — ASSESSMENT & PLAN NOTE
Daughter agreed to make appointment with cardiology to discuss the continuation of Plavix.  They agreed to get blood work done.

## 2024-02-19 NOTE — ASSESSMENT & PLAN NOTE
Form completed.  Patient made aware that she needs to be seen every 6 months for continuation of care including refills.

## 2024-02-22 LAB
BASOPHILS # BLD AUTO: 0 X10E3/UL (ref 0–0.2)
BASOPHILS NFR BLD AUTO: 0 %
EOSINOPHIL # BLD AUTO: 0.2 X10E3/UL (ref 0–0.4)
EOSINOPHIL NFR BLD AUTO: 3 %
ERYTHROCYTE [DISTWIDTH] IN BLOOD BY AUTOMATED COUNT: 12.6 % (ref 11.7–15.4)
HBA1C MFR BLD: 6.5 % (ref 4.8–5.6)
HCT VFR BLD AUTO: 38 % (ref 34–46.6)
HGB BLD-MCNC: 12.7 G/DL (ref 11.1–15.9)
IMM GRANULOCYTES # BLD AUTO: 0 X10E3/UL (ref 0–0.1)
IMM GRANULOCYTES NFR BLD AUTO: 0 %
LYMPHOCYTES # BLD AUTO: 1.7 X10E3/UL (ref 0.7–3.1)
LYMPHOCYTES NFR BLD AUTO: 30 %
MCH RBC QN AUTO: 28.3 PG (ref 26.6–33)
MCHC RBC AUTO-ENTMCNC: 33.4 G/DL (ref 31.5–35.7)
MCV RBC AUTO: 85 FL (ref 79–97)
MONOCYTES # BLD AUTO: 0.5 X10E3/UL (ref 0.1–0.9)
MONOCYTES NFR BLD AUTO: 8 %
NEUTROPHILS # BLD AUTO: 3.4 X10E3/UL (ref 1.4–7)
NEUTROPHILS NFR BLD AUTO: 59 %
PLATELET # BLD AUTO: 302 X10E3/UL (ref 150–450)
RBC # BLD AUTO: 4.48 X10E6/UL (ref 3.77–5.28)
WBC # BLD AUTO: 5.7 X10E3/UL (ref 3.4–10.8)

## 2024-02-23 LAB
ALBUMIN SERPL-MCNC: 4.5 G/DL (ref 3.9–4.9)
ALBUMIN/CREAT UR: <15 MG/G CREAT (ref 0–29)
ALBUMIN/GLOB SERPL: 1.6 {RATIO} (ref 1.2–2.2)
ALP SERPL-CCNC: 46 IU/L (ref 44–121)
ALT SERPL-CCNC: 26 IU/L (ref 0–32)
AST SERPL-CCNC: 31 IU/L (ref 0–40)
BILIRUB SERPL-MCNC: 0.3 MG/DL (ref 0–1.2)
BUN SERPL-MCNC: 11 MG/DL (ref 8–27)
BUN/CREAT SERPL: 15 (ref 12–28)
CALCIUM SERPL-MCNC: 9.6 MG/DL (ref 8.7–10.3)
CHLORIDE SERPL-SCNC: 103 MMOL/L (ref 96–106)
CHOLEST SERPL-MCNC: 126 MG/DL (ref 100–199)
CO2 SERPL-SCNC: 23 MMOL/L (ref 20–29)
CREAT SERPL-MCNC: 0.71 MG/DL (ref 0.57–1)
CREAT UR-MCNC: 19.5 MG/DL
EGFRCR SERPLBLD CKD-EPI 2021: 96 ML/MIN/1.73
GLOBULIN SER CALC-MCNC: 2.8 G/DL (ref 1.5–4.5)
GLUCOSE SERPL-MCNC: 105 MG/DL (ref 70–99)
HDLC SERPL-MCNC: 64 MG/DL
LDLC SERPL CALC-MCNC: 53 MG/DL (ref 0–99)
MICROALBUMIN UR-MCNC: <3 UG/ML
POTASSIUM SERPL-SCNC: 4.5 MMOL/L (ref 3.5–5.2)
PROT SERPL-MCNC: 7.3 G/DL (ref 6–8.5)
SODIUM SERPL-SCNC: 141 MMOL/L (ref 134–144)
TRIGL SERPL-MCNC: 35 MG/DL (ref 0–149)
TSH SERPL DL<=0.005 MIU/L-ACNC: 2.24 UIU/ML (ref 0.45–4.5)
VLDLC SERPL CALC-MCNC: 9 MG/DL (ref 5–40)

## 2024-02-28 ENCOUNTER — TELEPHONE (OUTPATIENT)
Dept: FAMILY MEDICINE | Facility: CLINIC | Age: 64
End: 2024-02-28
Payer: COMMERCIAL

## 2024-02-28 DIAGNOSIS — M25.511 ACUTE PAIN OF RIGHT SHOULDER: Primary | ICD-10-CM

## 2024-02-28 NOTE — TELEPHONE ENCOUNTER
[facility] Appointment Request   Provider: Dr. Wayne  Appointment Type: SDS  Reason for Visit: right shoulder pain  Available Day and Time: TODAY  Best Contact Number: [phone]    The practice will reach out to schedule your appointment within the next 2 business days.

## 2024-02-28 NOTE — TELEPHONE ENCOUNTER
Pt called requesting appointment for right shoulder pain for 7-8 days. I offered Friday but daughter doesn't want to wait 2 days and is just requesting xray script.

## 2024-03-04 ENCOUNTER — HOSPITAL ENCOUNTER (OUTPATIENT)
Dept: RADIOLOGY | Age: 64
Discharge: HOME | End: 2024-03-04
Attending: FAMILY MEDICINE
Payer: COMMERCIAL

## 2024-03-04 DIAGNOSIS — M25.511 ACUTE PAIN OF RIGHT SHOULDER: ICD-10-CM

## 2024-03-04 PROCEDURE — 73030 X-RAY EXAM OF SHOULDER: CPT | Mod: RT

## 2024-03-05 NOTE — RESULT ENCOUNTER NOTE
Dear Marjan,    The x-ray of the right shoulder shows mild degenerative changes including calcific tendinopathy.  You will benefit from physical therapy and anti-inflammatories.    Take care.    Dr. Wayne.

## 2024-03-11 ENCOUNTER — OFFICE VISIT (OUTPATIENT)
Dept: FAMILY MEDICINE | Facility: CLINIC | Age: 64
End: 2024-03-11
Payer: COMMERCIAL

## 2024-03-11 VITALS
DIASTOLIC BLOOD PRESSURE: 82 MMHG | BODY MASS INDEX: 30.36 KG/M2 | WEIGHT: 166 LBS | SYSTOLIC BLOOD PRESSURE: 140 MMHG | OXYGEN SATURATION: 98 % | HEART RATE: 70 BPM

## 2024-03-11 DIAGNOSIS — E11.9 TYPE 2 DIABETES MELLITUS WITHOUT COMPLICATION, WITHOUT LONG-TERM CURRENT USE OF INSULIN (CMS/HCC): ICD-10-CM

## 2024-03-11 DIAGNOSIS — M75.31 CALCIFIC TENDINITIS OF RIGHT SHOULDER: ICD-10-CM

## 2024-03-11 DIAGNOSIS — Z86.79 H/O CEREBRAL ANEURYSM REPAIR: Primary | ICD-10-CM

## 2024-03-11 DIAGNOSIS — Z98.890 H/O CEREBRAL ANEURYSM REPAIR: Primary | ICD-10-CM

## 2024-03-11 DIAGNOSIS — Z79.01 CHRONIC ANTICOAGULATION: ICD-10-CM

## 2024-03-11 PROCEDURE — 3008F BODY MASS INDEX DOCD: CPT | Performed by: FAMILY MEDICINE

## 2024-03-11 PROCEDURE — 99214 OFFICE O/P EST MOD 30 MIN: CPT | Performed by: FAMILY MEDICINE

## 2024-03-11 PROCEDURE — 3077F SYST BP >= 140 MM HG: CPT | Performed by: FAMILY MEDICINE

## 2024-03-11 PROCEDURE — 3079F DIAST BP 80-89 MM HG: CPT | Performed by: FAMILY MEDICINE

## 2024-03-11 NOTE — ASSESSMENT & PLAN NOTE
Patient informed that she cannot be on NSAIDs.  She can take Tylenol as needed.  She agreed to get PT.

## 2024-03-11 NOTE — PROGRESS NOTES
[facility]  Dr. Jaspreet Wayne  [address] [place] [address]     Marjan Davidson is a 63 y.o. female who present for   Chief Complaint   Patient presents with   • Follow-up        She has been getting severe right shoulder pain at x 10/10.  She is doing home exercises on her own.          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    • Dengue fever    • Depression    • Hair loss    • Hypertension    • Osteoporosis     Osteopenia now       Past Surgical History:   Procedure Laterality Date   • CEREBRAL ANEURYSM REPAIR     • EYE SURGERY      cataract removal 02/2019       Social History     Occupational History   • Not on file   Tobacco Use   • Smoking status: Never   • Smokeless tobacco: Never   Substance and Sexual Activity   • Alcohol use: Never   • Drug use: Never   • Sexual activity: Not on file        Family History   Problem Relation Age of Onset   • Heart disease Biological Father        Patient has no known allergies.      Current Outpatient Medications:   •  amLODIPine (NORVASC) 5 mg tablet, Take 1 tablet (5 mg total) by mouth daily., Disp: 90 tablet, Rfl: 1  •  captopriL (CAPOTEN) 25 mg tablet, Take 1 tablet (25 mg total) by mouth once as needed (if BP is more than 150/90). 1 tablet SL if blood pressure is more than 150/90, Disp: 90 tablet, Rfl: 1  •  clopidogreL (PLAVIX) 75 mg tablet, Take 1 tablet (75 mg total) by mouth daily., Disp: 90 tablet, Rfl: 0  •  losartan (COZAAR) 50 mg tablet, Take 1 tablet (50 mg total) by mouth daily., Disp: 90 tablet, Rfl: 1  •  alendronate (FOSAMAX) 35 mg tablet, TAKE 1 TABLET BY MOUTH EVERY 7 DAYS IN MORNING WITH A FULL GLASS OF WATER ON EMPTY STOMACH, DO NOT TAKE ANYTHING ELSE BY MOUTH OR DO NOT LIE DOWN FOR THE NEXT 30 MIN (Patient not taking: Reported on 3/11/2024), Disp: 12 tablet, Rfl: 1    Review of Systems    Vitals:    03/11/24 1033   BP: 140/82   BP Location: Right upper arm   Patient Position:  Sitting   Pulse: 70   SpO2: 98%   Weight: 75.3 kg (166 lb)     Body mass index is 30.36 kg/m².    Physical Exam  Constitutional:       General: She is not in acute distress.     Appearance: Normal appearance.   Musculoskeletal:         General: Normal range of motion.   Neurological:      Mental Status: She is alert.   Psychiatric:         Mood and Affect: Mood normal.         Behavior: Behavior normal.             Assessment   Problem List Items Addressed This Visit        Circulatory    H/O cerebral aneurysm repair - Primary     Daughter agreed to schedule appointment to discuss continuation of Plavix.         Relevant Orders    Ambulatory referral to Neurology       Musculoskeletal    Calcific tendinitis of right shoulder     Patient informed that she cannot be on NSAIDs.  She can take Tylenol as needed.  She agreed to get PT.         Relevant Orders    Ambulatory referral to Physical Therapy       Endocrine/Metabolic    Type 2 diabetes mellitus, without long-term current use of insulin (CMS/[facility])     Discussed low-carb diet.            Other    Chronic anticoagulation    Relevant Orders    Ambulatory referral to Neurology           Jaspreet Wayne MD  3/11/2024

## 2024-06-03 NOTE — TELEPHONE ENCOUNTER
Medicine Refill Request    Last Office Visit: 3/11/2024   Last Consult Visit: Visit date not found  Last Telemedicine Visit: Visit date not found    Next Appointment: Visit date not found      Current Outpatient Medications:     alendronate (FOSAMAX) 35 mg tablet, TAKE 1 TABLET BY MOUTH EVERY 7 DAYS IN MORNING WITH A FULL GLASS OF WATER ON EMPTY STOMACH, DO NOT TAKE ANYTHING ELSE BY MOUTH OR DO NOT LIE DOWN FOR THE NEXT 30 MIN (Patient not taking: Reported on 3/11/2024), Disp: 12 tablet, Rfl: 1    amLODIPine (NORVASC) 5 mg tablet, Take 1 tablet (5 mg total) by mouth daily., Disp: 90 tablet, Rfl: 1    captopriL (CAPOTEN) 25 mg tablet, Take 1 tablet (25 mg total) by mouth once as needed (if BP is more than 150/90). 1 tablet SL if blood pressure is more than 150/90, Disp: 90 tablet, Rfl: 1    clopidogreL (PLAVIX) 75 mg tablet, Take 1 tablet (75 mg total) by mouth daily., Disp: 90 tablet, Rfl: 0    losartan (COZAAR) 50 mg tablet, Take 1 tablet (50 mg total) by mouth daily., Disp: 90 tablet, Rfl: 1      BP Readings from Last 3 Encounters:   03/11/24 140/82   02/14/24 124/76   01/24/23 132/88       Recent Lab results:  Lab Results   Component Value Date    CHOL 126 02/22/2024   ,   Lab Results   Component Value Date    HDL 64 02/22/2024   ,   Lab Results   Component Value Date    LDLCALC 53 02/22/2024   ,   Lab Results   Component Value Date    TRIG 35 02/22/2024        Lab Results   Component Value Date    GLUCOSE 105 (H) 02/22/2024   ,   Lab Results   Component Value Date    HGBA1C 6.5 (H) 02/22/2024         Lab Results   Component Value Date    CREATININE 0.71 02/22/2024       Lab Results   Component Value Date    TSH 2.240 02/22/2024           Lab Results   Component Value Date    HGBA1C 6.5 (H) 02/22/2024

## 2024-06-04 RX ORDER — CLOPIDOGREL BISULFATE 75 MG/1
75 TABLET ORAL DAILY
Qty: 90 TABLET | Refills: 1 | Status: SHIPPED | OUTPATIENT
Start: 2024-06-04 | End: 2024-12-20

## 2024-08-31 DIAGNOSIS — I10 HYPERTENSION, UNSPECIFIED TYPE: ICD-10-CM

## 2024-09-03 RX ORDER — AMLODIPINE BESYLATE 5 MG/1
5 TABLET ORAL DAILY
Qty: 90 TABLET | Refills: 1 | Status: SHIPPED | OUTPATIENT
Start: 2024-09-03 | End: 2025-02-26

## 2024-09-03 RX ORDER — LOSARTAN POTASSIUM 50 MG/1
50 TABLET ORAL DAILY
Qty: 90 TABLET | Refills: 1 | Status: SHIPPED | OUTPATIENT
Start: 2024-09-03 | End: 2025-02-26

## 2024-09-03 NOTE — TELEPHONE ENCOUNTER
Medicine Refill Request    Last Office Visit: 3/11/2024   Last Consult Visit: Visit date not found  Last Telemedicine Visit: Visit date not found    Next Appointment: 9/18/2024      Current Outpatient Medications:     alendronate (FOSAMAX) 35 mg tablet, TAKE 1 TABLET BY MOUTH EVERY 7 DAYS IN MORNING WITH A FULL GLASS OF WATER ON EMPTY STOMACH, DO NOT TAKE ANYTHING ELSE BY MOUTH OR DO NOT LIE DOWN FOR THE NEXT 30 MIN (Patient not taking: Reported on 3/11/2024), Disp: 12 tablet, Rfl: 1    amLODIPine (NORVASC) 5 mg tablet, Take 1 tablet (5 mg total) by mouth daily., Disp: 90 tablet, Rfl: 1    captopriL (CAPOTEN) 25 mg tablet, Take 1 tablet (25 mg total) by mouth once as needed (if BP is more than 150/90). 1 tablet SL if blood pressure is more than 150/90, Disp: 90 tablet, Rfl: 1    clopidogreL (PLAVIX) 75 mg tablet, TAKE 1 TABLET BY MOUTH EVERY DAY, Disp: 90 tablet, Rfl: 1    losartan (COZAAR) 50 mg tablet, Take 1 tablet (50 mg total) by mouth daily., Disp: 90 tablet, Rfl: 1      BP Readings from Last 3 Encounters:   03/11/24 140/82   02/14/24 124/76   01/24/23 132/88       Recent Lab results:  Lab Results   Component Value Date    CHOL 126 02/22/2024   ,   Lab Results   Component Value Date    HDL 64 02/22/2024   ,   Lab Results   Component Value Date    LDLCALC 53 02/22/2024   ,   Lab Results   Component Value Date    TRIG 35 02/22/2024        Lab Results   Component Value Date    GLUCOSE 105 (H) 02/22/2024   ,   Lab Results   Component Value Date    HGBA1C 6.5 (H) 02/22/2024         Lab Results   Component Value Date    CREATININE 0.71 02/22/2024       Lab Results   Component Value Date    TSH 2.240 02/22/2024           Lab Results   Component Value Date    HGBA1C 6.5 (H) 02/22/2024      See the INR encounter from 8/6 for dosing instruction.    Dorys, RN  Triage Nurse

## 2024-09-18 ENCOUNTER — OFFICE VISIT (OUTPATIENT)
Dept: FAMILY MEDICINE | Facility: CLINIC | Age: 64
End: 2024-09-18
Payer: COMMERCIAL

## 2024-09-18 VITALS
HEIGHT: 62 IN | OXYGEN SATURATION: 99 % | TEMPERATURE: 98.5 F | BODY MASS INDEX: 30 KG/M2 | HEART RATE: 61 BPM | WEIGHT: 163 LBS | DIASTOLIC BLOOD PRESSURE: 80 MMHG | SYSTOLIC BLOOD PRESSURE: 126 MMHG

## 2024-09-18 DIAGNOSIS — I63.9 CEREBRAL INFARCTION, UNSPECIFIED MECHANISM (CMS/HCC): Primary | ICD-10-CM

## 2024-09-18 DIAGNOSIS — E11.00 TYPE 2 DIABETES MELLITUS WITH HYPEROSMOLARITY WITHOUT COMA, WITHOUT LONG-TERM CURRENT USE OF INSULIN (CMS/HCC): ICD-10-CM

## 2024-09-18 DIAGNOSIS — Z12.31 ENCOUNTER FOR SCREENING MAMMOGRAM FOR MALIGNANT NEOPLASM OF BREAST: ICD-10-CM

## 2024-09-18 DIAGNOSIS — I10 HYPERTENSION, UNSPECIFIED TYPE: ICD-10-CM

## 2024-09-18 PROBLEM — D69.6 THROMBOCYTOPENIA, ACQUIRED (CMS/HCC): Status: RESOLVED | Noted: 2022-01-04 | Resolved: 2024-09-18

## 2024-09-18 PROBLEM — M75.31 CALCIFIC TENDINITIS OF RIGHT SHOULDER: Status: RESOLVED | Noted: 2024-03-11 | Resolved: 2024-09-18

## 2024-09-18 PROCEDURE — 3008F BODY MASS INDEX DOCD: CPT | Performed by: FAMILY MEDICINE

## 2024-09-18 PROCEDURE — 3074F SYST BP LT 130 MM HG: CPT | Performed by: FAMILY MEDICINE

## 2024-09-18 PROCEDURE — 3079F DIAST BP 80-89 MM HG: CPT | Performed by: FAMILY MEDICINE

## 2024-09-18 PROCEDURE — 99214 OFFICE O/P EST MOD 30 MIN: CPT | Performed by: FAMILY MEDICINE

## 2024-09-18 ASSESSMENT — ENCOUNTER SYMPTOMS
NUMBNESS: 1
ARTHRALGIAS: 1

## 2024-09-18 NOTE — LETTER
[facility]       We are reaching out to you because our mutual patient, Marjan Davidson (: 1960), reported they had the preventive procedure(s) indicated below performed at your facility:     Last office note  Echo?    Please fax the most recent results to our office with this Cover Sheet.  (If no results are found, please check the appropriate box below)  ? - No results found  ? - Results attached    Please fax to: FAX# [phone]    THANK YOU FOR YOUR PARTNERSHIP IN   PROVIDING EXCELLENT CARE TO OUR PATIENTS!      This request is confidential and intended only for the use of the individual or entity to which it is addressed.  It may contain information that is privileged, confidential, and exempt from disclosure.  if the reader of this message is not the intended recipient, you are hereby notified that any dissemination, distribution, or copying of this communication is strictly prohibited.  If you believe you have received this communication in error, please notify us immediately at [phone]

## 2024-09-18 NOTE — ASSESSMENT & PLAN NOTE
She will consult neurology regarding continuation of Plavix and/or aspirin.  She agreed to get carotid Doppler done.

## 2024-09-23 DIAGNOSIS — E11.00 TYPE 2 DIABETES MELLITUS WITH HYPEROSMOLARITY WITHOUT COMA, WITHOUT LONG-TERM CURRENT USE OF INSULIN (CMS/HCC): Primary | ICD-10-CM

## 2024-09-23 RX ORDER — DEXTROSE 4 G
TABLET,CHEWABLE ORAL
Qty: 1 EACH | Refills: 0 | Status: SHIPPED | OUTPATIENT
Start: 2024-09-23

## 2024-09-23 NOTE — TELEPHONE ENCOUNTER
Glucometer    Medicine Refill Request    Last Office Visit: 9/18/2024   Last Consult Visit: Visit date not found  Last Telemedicine Visit: Visit date not found    Next Appointment: Visit date not found      Current Outpatient Medications:     amLODIPine (NORVASC) 5 mg tablet, Take 1 tablet (5 mg total) by mouth daily., Disp: 90 tablet, Rfl: 1    captopriL (CAPOTEN) 25 mg tablet, Take 1 tablet (25 mg total) by mouth once as needed (if BP is more than 150/90). 1 tablet SL if blood pressure is more than 150/90, Disp: 90 tablet, Rfl: 1    clopidogreL (PLAVIX) 75 mg tablet, TAKE 1 TABLET BY MOUTH EVERY DAY (Patient taking differently: Take 75 mg by mouth once a week.), Disp: 90 tablet, Rfl: 1    losartan (COZAAR) 50 mg tablet, TAKE 1 TABLET BY MOUTH EVERY DAY, Disp: 90 tablet, Rfl: 1      BP Readings from Last 3 Encounters:   09/18/24 126/80   03/11/24 140/82   02/14/24 124/76       Recent Lab results:  Lab Results   Component Value Date    CHOL 126 02/22/2024   ,   Lab Results   Component Value Date    HDL 64 02/22/2024   ,   Lab Results   Component Value Date    LDLCALC 53 02/22/2024   ,   Lab Results   Component Value Date    TRIG 35 02/22/2024        Lab Results   Component Value Date    GLUCOSE 105 (H) 02/22/2024   ,   Lab Results   Component Value Date    HGBA1C 6.5 (H) 02/22/2024         Lab Results   Component Value Date    CREATININE 0.71 02/22/2024       Lab Results   Component Value Date    TSH 2.240 02/22/2024           Lab Results   Component Value Date    HGBA1C 6.5 (H) 02/22/2024

## 2024-09-27 DIAGNOSIS — E11.00 TYPE 2 DIABETES MELLITUS WITH HYPEROSMOLARITY WITHOUT COMA, WITHOUT LONG-TERM CURRENT USE OF INSULIN (CMS/HCC): Primary | ICD-10-CM

## 2024-09-27 RX ORDER — LANCETS
EACH MISCELLANEOUS
Qty: 100 EACH | Refills: 2 | Status: SHIPPED | OUTPATIENT
Start: 2024-09-27

## 2024-09-27 RX ORDER — IBUPROFEN 200 MG
CAPSULE ORAL
Qty: 100 STRIP | Refills: 2 | Status: SHIPPED | OUTPATIENT
Start: 2024-09-27

## 2024-10-02 ENCOUNTER — HOSPITAL ENCOUNTER (OUTPATIENT)
Dept: CARDIOLOGY | Age: 64
Discharge: HOME | End: 2024-10-02
Attending: FAMILY MEDICINE
Payer: COMMERCIAL

## 2024-10-02 DIAGNOSIS — I63.9 CEREBRAL INFARCTION, UNSPECIFIED MECHANISM (CMS/HCC): ICD-10-CM

## 2024-10-02 PROCEDURE — 93880 EXTRACRANIAL BILAT STUDY: CPT

## 2024-10-03 LAB
LEFT CCA DIST DIAS: 27.3 CM/S
LEFT CCA DIST SYS: 97.4 CM/S
LEFT CCA MID DIAS: 24.5 CM/S
LEFT CCA MID SYS: 100 CM/S
LEFT CCA PROX DIAS: 22.4 CM/S
LEFT CCA PROX SYS: 93.9 CM/S
LEFT ICA DIST DIAS: 28.7 CM/S
LEFT ICA DIST SYS: 100 CM/S
LEFT ICA MID DIAS: 20.3 CM/S
LEFT ICA MID SYS: 58.1 CM/S
LEFT ICA PROX DIAS: 19.6 CM/S
LEFT ICA PROX SYS: 72.9 CM/S
LEFT ICA/CCA SYS: 1.03
LEFT SUBCLAVIAN SYS: 29 CM/S
LEFT VERTEBRAL SYS: 40.8 CM/S
LT ECA PROX EDV: 17.5 CM/S
LT ECA PROX PSV: 65.9 CM/S
LT SUBCLAVIAN PROX PSV: 29 CM/S
LT VERTEBRAL PROX EDV: 10.8 CM/S
LT VERTEBRAL PROX PSV: 40.8 CM/S
RIGHT CCA DIST DIAS: 17.4 CM/S
RIGHT CCA DIST SYS: 86.4 CM/S
RIGHT CCA MID DIAS: 17.4 CM/S
RIGHT CCA MID SYS: 93.8 CM/S
RIGHT CCA PROX DIAS: 11.8 CM/S
RIGHT CCA PROX SYS: 89.5 CM/S
RIGHT ECA SYS: 94.4 CM/S
RIGHT ICA DIST DIAS: 17.5 CM/S
RIGHT ICA DIST SYS: 75 CM/S
RIGHT ICA MID DIAS: 14.9 CM/S
RIGHT ICA MID SYS: 87 CM/S
RIGHT ICA PROX DIAS: 11.8 CM/S
RIGHT ICA PROX SYS: 59 CM/S
RIGHT ICA/CCA SYS: 1.01
RIGHT SUBCLAVIAN SYS: 49.4 CM/S
RIGHT VA 1 EDV: 18.2 CM/S
RIGHT VA 1 MEAN: 43.2 CM/S
RIGHT VA 1 PI: 1.74
RIGHT VA 1 PSV: 93.2 CM/S
RIGHT VA 1 RI: 0.8
RIGHT VA 1 S/D RATIO: 5.12
RIGHT VERTEBRAL SYS: 93.2 CM/S
RT ECA PROC EDV: 11.2 CM/S
RT SUBCLAVIAN PROX PSV: 49.4 CM/S
RT VERTEBRAL PROX EDV: 18.2 CM/S

## 2024-10-09 NOTE — RESULT ENCOUNTER NOTE
Dear Clover,    The ultrasound of your carotid arteries were normal.  Please contact us with any concerns.    Best,    Dr. Wayne.

## 2024-12-19 NOTE — TELEPHONE ENCOUNTER
Medicine Refill Request    Last Office Visit: 9/18/2024   Last Consult Visit: Visit date not found  Last Telemedicine Visit: Visit date not found    Next Appointment: Visit date not found      Current Outpatient Medications:     amLODIPine (NORVASC) 5 mg tablet, Take 1 tablet (5 mg total) by mouth daily., Disp: 90 tablet, Rfl: 1    blood sugar diagnostic (glucose blood) strip, Test blood sugars once daily, Disp: 100 strip, Rfl: 2    blood-glucose meter misc, Use as directed, Disp: 1 each, Rfl: 0    captopriL (CAPOTEN) 25 mg tablet, Take 1 tablet (25 mg total) by mouth once as needed (if BP is more than 150/90). 1 tablet SL if blood pressure is more than 150/90, Disp: 90 tablet, Rfl: 1    clopidogreL (PLAVIX) 75 mg tablet, TAKE 1 TABLET BY MOUTH EVERY DAY (Patient taking differently: Take 75 mg by mouth once a week.), Disp: 90 tablet, Rfl: 1    lancets misc, Test blood glucose once daily, Disp: 100 each, Rfl: 2    losartan (COZAAR) 50 mg tablet, TAKE 1 TABLET BY MOUTH EVERY DAY, Disp: 90 tablet, Rfl: 1      BP Readings from Last 3 Encounters:   09/18/24 126/80   03/11/24 140/82   02/14/24 124/76       Recent Lab results:  Lab Results   Component Value Date    CHOL 126 02/22/2024   ,   Lab Results   Component Value Date    HDL 64 02/22/2024   ,   Lab Results   Component Value Date    LDLCALC 53 02/22/2024   ,   Lab Results   Component Value Date    TRIG 35 02/22/2024        Lab Results   Component Value Date    GLUCOSE 105 (H) 02/22/2024   ,   Lab Results   Component Value Date    HGBA1C 6.5 (H) 02/22/2024         Lab Results   Component Value Date    CREATININE 0.71 02/22/2024       Lab Results   Component Value Date    TSH 2.240 02/22/2024           Lab Results   Component Value Date    HGBA1C 6.5 (H) 02/22/2024

## 2024-12-20 RX ORDER — CLOPIDOGREL BISULFATE 75 MG/1
75 TABLET ORAL DAILY
Qty: 90 TABLET | Refills: 1 | Status: SHIPPED | OUTPATIENT
Start: 2024-12-20

## 2025-02-26 DIAGNOSIS — I10 HYPERTENSION, UNSPECIFIED TYPE: ICD-10-CM

## 2025-02-26 RX ORDER — AMLODIPINE BESYLATE 5 MG/1
5 TABLET ORAL DAILY
Qty: 90 TABLET | Refills: 1 | Status: SHIPPED | OUTPATIENT
Start: 2025-02-26 | End: 2025-08-25

## 2025-02-26 RX ORDER — LOSARTAN POTASSIUM 50 MG/1
50 TABLET ORAL DAILY
Qty: 90 TABLET | Refills: 1 | Status: SHIPPED | OUTPATIENT
Start: 2025-02-26

## 2025-02-26 NOTE — TELEPHONE ENCOUNTER
Medicine Refill Request    Last Office Visit: 9/18/2024   Last Consult Visit: Visit date not found  Last Telemedicine Visit: Visit date not found    Next Appointment: Visit date not found      Current Outpatient Medications:     amLODIPine (NORVASC) 5 mg tablet, Take 1 tablet (5 mg total) by mouth daily., Disp: 90 tablet, Rfl: 1    blood sugar diagnostic (glucose blood) strip, Test blood sugars once daily, Disp: 100 strip, Rfl: 2    blood-glucose meter misc, Use as directed, Disp: 1 each, Rfl: 0    captopriL (CAPOTEN) 25 mg tablet, Take 1 tablet (25 mg total) by mouth once as needed (if BP is more than 150/90). 1 tablet SL if blood pressure is more than 150/90, Disp: 90 tablet, Rfl: 1    clopidogreL (PLAVIX) 75 mg tablet, TAKE 1 TABLET BY MOUTH EVERY DAY, Disp: 90 tablet, Rfl: 1    lancets misc, Test blood glucose once daily, Disp: 100 each, Rfl: 2    losartan (COZAAR) 50 mg tablet, TAKE 1 TABLET BY MOUTH EVERY DAY, Disp: 90 tablet, Rfl: 1    BP Readings from Last 3 Encounters:   09/18/24 126/80   03/11/24 140/82   02/14/24 124/76       Recent Lab results:  Lab Results   Component Value Date    CHOL 126 02/22/2024   ,   Lab Results   Component Value Date    HDL 64 02/22/2024   ,   Lab Results   Component Value Date    LDLCALC 53 02/22/2024   ,   Lab Results   Component Value Date    TRIG 35 02/22/2024        Lab Results   Component Value Date    GLUCOSE 105 (H) 02/22/2024   ,   Lab Results   Component Value Date    HGBA1C 6.5 (H) 02/22/2024         Lab Results   Component Value Date    CREATININE 0.71 02/22/2024       Lab Results   Component Value Date    TSH 2.240 02/22/2024           Lab Results   Component Value Date    HGBA1C 6.5 (H) 02/22/2024

## 2025-04-29 ENCOUNTER — OFFICE VISIT (OUTPATIENT)
Dept: FAMILY MEDICINE | Facility: CLINIC | Age: 65
End: 2025-04-29
Payer: COMMERCIAL

## 2025-04-29 VITALS
TEMPERATURE: 97.3 F | WEIGHT: 157 LBS | HEIGHT: 62 IN | HEART RATE: 73 BPM | BODY MASS INDEX: 28.89 KG/M2 | OXYGEN SATURATION: 99 % | SYSTOLIC BLOOD PRESSURE: 147 MMHG | DIASTOLIC BLOOD PRESSURE: 87 MMHG

## 2025-04-29 DIAGNOSIS — E11.00 TYPE 2 DIABETES MELLITUS WITH HYPEROSMOLARITY WITHOUT COMA, WITHOUT LONG-TERM CURRENT USE OF INSULIN (CMS/HCC): ICD-10-CM

## 2025-04-29 DIAGNOSIS — I10 HYPERTENSION, UNSPECIFIED TYPE: ICD-10-CM

## 2025-04-29 DIAGNOSIS — Z00.00 ROUTINE GENERAL MEDICAL EXAMINATION AT A HEALTH CARE FACILITY: Primary | ICD-10-CM

## 2025-04-29 PROCEDURE — 3079F DIAST BP 80-89 MM HG: CPT | Performed by: FAMILY MEDICINE

## 2025-04-29 PROCEDURE — 3077F SYST BP >= 140 MM HG: CPT | Performed by: FAMILY MEDICINE

## 2025-04-29 PROCEDURE — 99396 PREV VISIT EST AGE 40-64: CPT | Performed by: FAMILY MEDICINE

## 2025-04-29 PROCEDURE — 3008F BODY MASS INDEX DOCD: CPT | Performed by: FAMILY MEDICINE

## 2025-04-29 RX ORDER — APIXABAN 5 MG/1
TABLET, FILM COATED ORAL
COMMUNITY
Start: 2025-04-28

## 2025-04-29 ASSESSMENT — PATIENT HEALTH QUESTIONNAIRE - PHQ9: SUM OF ALL RESPONSES TO PHQ9 QUESTIONS 1 & 2: 0

## 2025-04-29 NOTE — ASSESSMENT & PLAN NOTE
Reminded her to get mammogram done.  Advised her to use earwax removal drops on the left ear canal.  She will schedule another appointment if she needs ear irrigation.

## 2025-04-29 NOTE — PROGRESS NOTES
[facility]  Dr. Jaspreet Wayne  [address] [place] [address]     Marjan Davidson is a 64 y.o. female who present for   Chief Complaint   Patient presents with    Annual Exam        Patient is accompanied by her daughter.  [place].  She was evaluated there and was found to have a rapid heartbeat and was placed on Eliquis.  Yesterday she saw the cardiologist.  On Keflex x foot infecction on Rt 4TH toe.  She feels well except gaining weight.  She does feel her left ear clogged.            Past Medical History:   Diagnosis Date    Blood clots in brain     Cerebral infarction (CMS/[facility]) 09/18/2024    Dengue fever     Depression     Hair loss     Hypertension     Osteoporosis     Osteopenia now    Type 2 diabetes mellitus with hyperosmolarity without coma, without long-term current use of insulin (CMS/[facility]) 02/14/2024       Past Surgical History   Procedure Laterality Date    Cerebral aneurysm repair      Eye surgery      cataract removal 02/2019       Social History     Occupational History    Not on file   Tobacco Use    Smoking status: Never    Smokeless tobacco: Never   Substance and Sexual Activity    Alcohol use: Never    Drug use: Never    Sexual activity: Not on file        Family History   Problem Relation Name Age of Onset    Heart disease Biological Father         Patient has no known allergies.      Current Outpatient Medications:     amLODIPine (NORVASC) 5 mg tablet, TAKE 1 TABLET (5 MG TOTAL) BY MOUTH DAILY., Disp: 90 tablet, Rfl: 1    blood sugar diagnostic (glucose blood) strip, Test blood sugars once daily, Disp: 100 strip, Rfl: 2    blood-glucose meter misc, Use as directed, Disp: 1 each, Rfl: 0    captopriL (CAPOTEN) 25 mg tablet, Take 1 tablet (25 mg total) by mouth once as needed (if BP is more than 150/90). 1 tablet SL if blood pressure is more than 150/90, Disp: 90 tablet, Rfl: 1    ELIQUIS 5 mg tablet, ,  "Disp: , Rfl:     lancets misc, Test blood glucose once daily, Disp: 100 each, Rfl: 2    losartan (COZAAR) 50 mg tablet, TAKE 1 TABLET BY MOUTH EVERY DAY, Disp: 90 tablet, Rfl: 1    Review of Systems    Vitals:    04/29/25 1140   BP: (!) 147/87   BP Location: Right upper arm   Patient Position: Sitting   Pulse: 73   Temp: 36.3 °C (97.3 °F)   TempSrc: Temporal   SpO2: 99%   Weight: 71.2 kg (157 lb)   Height: 1.575 m (5' 2\")     Body mass index is 28.72 kg/m².    Physical Exam  Vitals reviewed.   Constitutional:       Appearance: Normal appearance.   HENT:      Right Ear: Tympanic membrane normal.      Left Ear: Tympanic membrane normal. There is impacted cerumen.      Mouth/Throat:      Mouth: Mucous membranes are moist.   Eyes:      Conjunctiva/sclera: Conjunctivae normal.   Neck:      Comments: No thyromegaly.  Cardiovascular:      Rate and Rhythm: Normal rate.      Heart sounds: Normal heart sounds. No murmur heard.  Pulmonary:      Effort: Pulmonary effort is normal.      Breath sounds: Normal breath sounds. No rales.   Abdominal:      General: Bowel sounds are normal.      Palpations: Abdomen is soft.      Tenderness: There is no abdominal tenderness.   Musculoskeletal:         General: Normal range of motion.      Cervical back: Normal range of motion.      Right lower leg: No edema.      Left lower leg: No edema.   Lymphadenopathy:      Cervical: No cervical adenopathy.   Skin:     General: Skin is warm.   Neurological:      General: No focal deficit present.      Mental Status: She is alert and oriented to person, place, and time.   Psychiatric:         Mood and Affect: Mood normal.             Assessment   Problem List Items Addressed This Visit       Hypertension    Continue current meds.         Relevant Orders    CBC and Differential    Comprehensive metabolic panel    Lipid panel    TSH 3rd Generation    Hemoglobin A1c    Microalbumin/Creatinine Ur Random    Routine general medical examination at [facility] " care facility - Primary    Reminded her to get mammogram done.  Advised her to use earwax removal drops on the left ear canal.  She will schedule another appointment if she needs ear irrigation.         Type 2 diabetes mellitus with hyperosmolarity without coma, without long-term current use of insulin (CMS/[facility])    Discussed if A1c is high she may start on medication.         Relevant Orders    CBC and Differential    Comprehensive metabolic panel    Lipid panel    TSH 3rd Generation    Hemoglobin A1c    Microalbumin/Creatinine Ur Random           Jaspreet Wayne MD  4/29/2025

## 2025-05-01 LAB
BASOPHILS # BLD AUTO: 0.1 X10E3/UL (ref 0–0.2)
BASOPHILS NFR BLD AUTO: 1 %
EOSINOPHIL # BLD AUTO: 0.2 X10E3/UL (ref 0–0.4)
EOSINOPHIL NFR BLD AUTO: 3 %
ERYTHROCYTE [DISTWIDTH] IN BLOOD BY AUTOMATED COUNT: 13.9 % (ref 11.7–15.4)
HBA1C MFR BLD: 6.4 % (ref 4.8–5.6)
HCT VFR BLD AUTO: 38.8 % (ref 34–46.6)
HGB BLD-MCNC: 12.7 G/DL (ref 11.1–15.9)
IMM GRANULOCYTES # BLD AUTO: 0 X10E3/UL (ref 0–0.1)
IMM GRANULOCYTES NFR BLD AUTO: 0 %
LYMPHOCYTES # BLD AUTO: 2.2 X10E3/UL (ref 0.7–3.1)
LYMPHOCYTES NFR BLD AUTO: 34 %
MCH RBC QN AUTO: 28 PG (ref 26.6–33)
MCHC RBC AUTO-ENTMCNC: 32.7 G/DL (ref 31.5–35.7)
MCV RBC AUTO: 86 FL (ref 79–97)
MONOCYTES # BLD AUTO: 0.5 X10E3/UL (ref 0.1–0.9)
MONOCYTES NFR BLD AUTO: 8 %
NEUTROPHILS # BLD AUTO: 3.5 X10E3/UL (ref 1.4–7)
NEUTROPHILS NFR BLD AUTO: 54 %
PLATELET # BLD AUTO: 305 X10E3/UL (ref 150–450)
RBC # BLD AUTO: 4.54 X10E6/UL (ref 3.77–5.28)
WBC # BLD AUTO: 6.5 X10E3/UL (ref 3.4–10.8)

## 2025-05-02 LAB
ALBUMIN SERPL-MCNC: 4.4 G/DL (ref 3.9–4.9)
ALBUMIN/CREAT UR: 14 MG/G CREAT (ref 0–29)
ALP SERPL-CCNC: 52 IU/L (ref 44–121)
ALT SERPL-CCNC: 21 IU/L (ref 0–32)
AST SERPL-CCNC: 24 IU/L (ref 0–40)
BILIRUB SERPL-MCNC: 0.3 MG/DL (ref 0–1.2)
BUN SERPL-MCNC: 13 MG/DL (ref 8–27)
BUN/CREAT SERPL: 18 (ref 12–28)
CALCIUM SERPL-MCNC: 9.7 MG/DL (ref 8.7–10.3)
CHLORIDE SERPL-SCNC: 102 MMOL/L (ref 96–106)
CHOLEST SERPL-MCNC: 138 MG/DL (ref 100–199)
CO2 SERPL-SCNC: 22 MMOL/L (ref 20–29)
CREAT SERPL-MCNC: 0.71 MG/DL (ref 0.57–1)
CREAT UR-MCNC: 46 MG/DL
EGFRCR SERPLBLD CKD-EPI 2021: 95 ML/MIN/1.73
GLOBULIN SER CALC-MCNC: 2.5 G/DL (ref 1.5–4.5)
GLUCOSE SERPL-MCNC: 104 MG/DL (ref 70–99)
HDLC SERPL-MCNC: 72 MG/DL
LDLC SERPL CALC-MCNC: 57 MG/DL (ref 0–99)
MICROALBUMIN UR-MCNC: 6.6 UG/ML
POTASSIUM SERPL-SCNC: 4.5 MMOL/L (ref 3.5–5.2)
PROT SERPL-MCNC: 6.9 G/DL (ref 6–8.5)
SODIUM SERPL-SCNC: 142 MMOL/L (ref 134–144)
TRIGL SERPL-MCNC: 35 MG/DL (ref 0–149)
TSH SERPL DL<=0.005 MIU/L-ACNC: 2.07 UIU/ML (ref 0.45–4.5)
VLDLC SERPL CALC-MCNC: 9 MG/DL (ref 5–40)

## 2025-05-20 ENCOUNTER — TELEPHONE (OUTPATIENT)
Dept: PRIMARY CARE | Facility: CLINIC | Age: 65
End: 2025-05-20
Payer: COMMERCIAL

## 2025-08-05 ENCOUNTER — TELEPHONE (OUTPATIENT)
Dept: SCHEDULING | Age: 65
End: 2025-08-05
Payer: COMMERCIAL

## 2025-08-05 NOTE — TELEPHONE ENCOUNTER
[facility] Appointment Request   Provider: Tone   Appointment Type: ed f/u  Reason for Visit: 8/4/25, ER (doesnt know which), dizziness/bp  Available Day and Time: morning   Best Contact Number: [phone]     The practice will reach out to schedule your appointment within the next 2 business days.    100

## 2025-08-07 ENCOUNTER — OFFICE VISIT (OUTPATIENT)
Dept: PRIMARY CARE | Facility: CLINIC | Age: 65
End: 2025-08-07
Payer: COMMERCIAL

## 2025-08-07 VITALS
HEART RATE: 72 BPM | SYSTOLIC BLOOD PRESSURE: 151 MMHG | WEIGHT: 164.6 LBS | OXYGEN SATURATION: 99 % | RESPIRATION RATE: 18 BRPM | DIASTOLIC BLOOD PRESSURE: 90 MMHG | HEIGHT: 62 IN | BODY MASS INDEX: 30.29 KG/M2

## 2025-08-07 DIAGNOSIS — I10 HYPERTENSION, UNSPECIFIED TYPE: ICD-10-CM

## 2025-08-07 DIAGNOSIS — R42 DIZZINESS: Primary | ICD-10-CM

## 2025-08-07 DIAGNOSIS — Z12.31 BREAST CANCER SCREENING BY MAMMOGRAM: ICD-10-CM

## 2025-08-07 DIAGNOSIS — E11.00 TYPE 2 DIABETES MELLITUS WITH HYPEROSMOLARITY WITHOUT COMA, WITHOUT LONG-TERM CURRENT USE OF INSULIN (CMS/HCC): ICD-10-CM

## 2025-08-07 DIAGNOSIS — Z79.01 CHRONIC ANTICOAGULATION: ICD-10-CM

## 2025-08-07 DIAGNOSIS — E87.1 HYPONATREMIA: ICD-10-CM

## 2025-08-07 PROCEDURE — 3077F SYST BP >= 140 MM HG: CPT | Performed by: FAMILY MEDICINE

## 2025-08-07 PROCEDURE — 99214 OFFICE O/P EST MOD 30 MIN: CPT | Performed by: FAMILY MEDICINE

## 2025-08-07 PROCEDURE — 3080F DIAST BP >= 90 MM HG: CPT | Performed by: FAMILY MEDICINE

## 2025-08-07 PROCEDURE — 3008F BODY MASS INDEX DOCD: CPT | Performed by: FAMILY MEDICINE

## 2025-08-07 RX ORDER — ONDANSETRON 4 MG/1
4 TABLET, ORALLY DISINTEGRATING ORAL 3 TIMES DAILY
COMMUNITY
Start: 2025-08-05 | End: 2025-08-09

## 2025-08-07 RX ORDER — MECLIZINE HYDROCHLORIDE 25 MG/1
25 TABLET ORAL 2 TIMES DAILY PRN
COMMUNITY
Start: 2025-08-05 | End: 2025-08-10

## 2025-08-07 ASSESSMENT — ENCOUNTER SYMPTOMS: NERVOUS/ANXIOUS: 1

## 2025-08-07 NOTE — ASSESSMENT & PLAN NOTE
Likely multifactorial.  Severe episode may have been caused by low sodium.  Need to rule out SIADH.

## 2025-08-07 NOTE — PROGRESS NOTES
[facility]  Dr. Jaspreet aWyne  [address] [place] [address]     Marjan Davidson is a 65 y.o. female who present for   Chief Complaint   Patient presents with    Follow-up     Pt seen at [facility] ED due to vertigo        Patient is accompanied by her daughter who lives in this area.  Patient is now living with her son in [place].  She had a severe episode of dizziness recently and was taken to the [facility] in [place].  Patient says that episode was severe.  She does not describe any spinning sensation.  He does feel heaviness in her right side of the hand where she had the coil placed in the past.  She has an appointment with a neurologist coming up.  She has not been taking the captopril.            Past Medical History:   Diagnosis Date    Blood clots in brain     Cerebral infarction (CMS/[facility]) 09/18/2024    Dengue fever     Depression     Hair loss     Hypertension     Osteoporosis     Osteopenia now    Type 2 diabetes mellitus with hyperosmolarity without coma, without long-term current use of insulin (CMS/[facility]) 02/14/2024       Past Surgical History   Procedure Laterality Date    Cerebral aneurysm repair      Eye surgery      cataract removal 02/2019       Social History     Occupational History    Not on file   Tobacco Use    Smoking status: Never    Smokeless tobacco: Never   Substance and Sexual Activity    Alcohol use: Never    Drug use: Never    Sexual activity: Not on file        Family History   Problem Relation Name Age of Onset    Heart disease Biological Father         Patient has no known allergies.      Current Outpatient Medications:     amLODIPine (NORVASC) 5 mg tablet, TAKE 1 TABLET (5 MG TOTAL) BY MOUTH DAILY., Disp: 90 tablet, Rfl: 1    blood sugar diagnostic (glucose blood) strip, Test blood sugars once daily, Disp: 100 strip, Rfl: 2    blood-glucose meter misc, Use as directed, Disp: 1 each, Rfl: 0    ELIQUIS 5 mg tablet, , Disp: , Rfl:      "lancets misc, Test blood glucose once daily, Disp: 100 each, Rfl: 2    losartan (COZAAR) 50 mg tablet, TAKE 1 TABLET BY MOUTH EVERY DAY, Disp: 90 tablet, Rfl: 1    meclizine (ANTIVERT) 25 mg tablet, Take 25 mg by mouth 2 (two) times a day as needed., Disp: , Rfl:     ondansetron ODT (ZOFRAN-ODT) 4 mg disintegrating tablet, Take 4 mg by mouth 3 times daily., Disp: , Rfl:     captopriL (CAPOTEN) 25 mg tablet, Take 1 tablet (25 mg total) by mouth once as needed (if BP is more than 150/90). 1 tablet SL if blood pressure is more than 150/90, Disp: 90 tablet, Rfl: 1    Review of Systems   Psychiatric/Behavioral:  The patient is nervous/anxious.        Vitals:    08/07/25 1007   BP: (!) 151/90   BP Location: Right upper arm   Patient Position: Sitting   Pulse: 72   Resp: 18   SpO2: 99%   Weight: 74.7 kg (164 lb 9.6 oz)   Height: 1.575 m (5' 2\")     Body mass index is 30.11 kg/m².    Physical Exam  Vitals reviewed.   Constitutional:       Appearance: Normal appearance.   HENT:      Right Ear: Tympanic membrane normal.      Ears:      Comments: Left tympanic membrane obstructed with cerumen.     Mouth/Throat:      Mouth: Mucous membranes are moist.   Eyes:      Conjunctiva/sclera: Conjunctivae normal.   Neck:      Vascular: No carotid bruit.      Comments: No thyromegaly.  Cardiovascular:      Rate and Rhythm: Normal rate.      Heart sounds: Normal heart sounds. No murmur heard.  Pulmonary:      Effort: Pulmonary effort is normal.      Breath sounds: Normal breath sounds. No rales.   Abdominal:      General: Bowel sounds are normal.      Palpations: Abdomen is soft.      Tenderness: There is no abdominal tenderness.   Musculoskeletal:         General: Normal range of motion.      Cervical back: Normal range of motion.      Right lower leg: No edema.      Left lower leg: No edema.   Lymphadenopathy:      Cervical: No cervical adenopathy.   Skin:     General: Skin is warm.   Neurological:      General: No focal deficit " present.      Mental Status: She is alert and oriented to person, place, and time.   Psychiatric:         Mood and Affect: Mood normal.             Assessment   Problem List Items Addressed This Visit       Hypertension    Blood pressure at home has been fine.         Relevant Orders    Renal function panel    Osmolality, urine    Osmolality, Serum    Sodium, urine, random    Hemoglobin A1c    Type 2 diabetes mellitus with hyperosmolarity without coma, without long-term current use of insulin (CMS/[facility])    A1c last May was 6.4.         Chronic anticoagulation    Relevant Orders    Renal function panel    Osmolality, urine    Osmolality, Serum    Sodium, urine, random    Hemoglobin A1c    Dizziness - Primary    Likely multifactorial.  Severe episode may have been caused by low sodium.  Need to rule out SIADH.         Relevant Orders    Renal function panel    Osmolality, urine    Osmolality, Serum    Sodium, urine, random    Hemoglobin A1c    X-RAY CHEST 2 VIEWS    Hyponatremia    Relevant Orders    Renal function panel    Osmolality, urine    Osmolality, Serum    Sodium, urine, random    Hemoglobin A1c    X-RAY CHEST 2 VIEWS     Other Visit Diagnoses         Breast cancer screening by mammogram        Relevant Orders    BI SCREENING MAMMOGRAM BILATERAL(TOMOSYNTHESIS)    Renal function panel    Osmolality, urine    Osmolality, Serum    Sodium, urine, random    Hemoglobin A1c                Jaspreet Wayne MD  8/7/2025

## 2025-08-13 ENCOUNTER — HOSPITAL ENCOUNTER (OUTPATIENT)
Dept: RADIOLOGY | Age: 65
Discharge: HOME | End: 2025-08-13
Attending: FAMILY MEDICINE
Payer: COMMERCIAL

## 2025-08-13 ENCOUNTER — APPOINTMENT (OUTPATIENT)
Dept: RADIOLOGY | Age: 65
End: 2025-08-13
Payer: COMMERCIAL

## 2025-08-13 DIAGNOSIS — R42 DIZZINESS: ICD-10-CM

## 2025-08-13 DIAGNOSIS — Z12.31 ENCOUNTER FOR SCREENING MAMMOGRAM FOR MALIGNANT NEOPLASM OF BREAST: ICD-10-CM

## 2025-08-13 DIAGNOSIS — E87.1 HYPONATREMIA: ICD-10-CM

## 2025-08-13 PROCEDURE — 77063 BREAST TOMOSYNTHESIS BI: CPT

## 2025-08-13 PROCEDURE — 71046 X-RAY EXAM CHEST 2 VIEWS: CPT

## 2025-08-14 LAB
ALBUMIN SERPL-MCNC: 4.4 G/DL (ref 3.9–4.9)
BUN SERPL-MCNC: 11 MG/DL (ref 8–27)
BUN/CREAT SERPL: 16 (ref 12–28)
CALCIUM SERPL-MCNC: 9.5 MG/DL (ref 8.7–10.3)
CHLORIDE SERPL-SCNC: 96 MMOL/L (ref 96–106)
CO2 SERPL-SCNC: 23 MMOL/L (ref 20–29)
CREAT SERPL-MCNC: 0.7 MG/DL (ref 0.57–1)
EGFRCR SERPLBLD CKD-EPI 2021: 97 ML/MIN/1.73
GLUCOSE SERPL-MCNC: 89 MG/DL (ref 70–99)
HBA1C MFR BLD: 6.3 % (ref 4.8–5.6)
PHOSPHATE SERPL-MCNC: 3.3 MG/DL (ref 3–4.3)
POTASSIUM SERPL-SCNC: 4.2 MMOL/L (ref 3.5–5.2)
SODIUM SERPL-SCNC: 134 MMOL/L (ref 134–144)
SODIUM UR-SCNC: 22 MMOL/L

## 2025-08-15 LAB
OSMOLALITY SERPL: 271 MOSMOL/KG (ref 280–301)
OSMOLALITY UR: 143 MOSMOL/KG

## 2025-08-15 RX ORDER — MECLIZINE HYDROCHLORIDE 25 MG/1
25 TABLET ORAL 2 TIMES DAILY PRN
Qty: 30 TABLET | Refills: 0 | Status: SHIPPED | OUTPATIENT
Start: 2025-08-15

## 2025-08-23 DIAGNOSIS — I10 HYPERTENSION, UNSPECIFIED TYPE: ICD-10-CM

## 2025-08-26 RX ORDER — AMLODIPINE BESYLATE 5 MG/1
5 TABLET ORAL DAILY
Qty: 90 TABLET | Refills: 1 | Status: SHIPPED | OUTPATIENT
Start: 2025-08-26 | End: 2026-02-22

## 2025-08-26 RX ORDER — LOSARTAN POTASSIUM 50 MG/1
50 TABLET ORAL DAILY
Qty: 90 TABLET | Refills: 1 | Status: SHIPPED | OUTPATIENT
Start: 2025-08-26